# Patient Record
Sex: MALE | Race: WHITE | NOT HISPANIC OR LATINO | Employment: FULL TIME | URBAN - METROPOLITAN AREA
[De-identification: names, ages, dates, MRNs, and addresses within clinical notes are randomized per-mention and may not be internally consistent; named-entity substitution may affect disease eponyms.]

---

## 2019-09-05 ENCOUNTER — OFFICE VISIT (OUTPATIENT)
Dept: FAMILY MEDICINE | Facility: CLINIC | Age: 29
End: 2019-09-05
Payer: COMMERCIAL

## 2019-09-05 VITALS
SYSTOLIC BLOOD PRESSURE: 118 MMHG | WEIGHT: 135.5 LBS | HEIGHT: 65 IN | HEART RATE: 65 BPM | BODY MASS INDEX: 22.57 KG/M2 | DIASTOLIC BLOOD PRESSURE: 75 MMHG | OXYGEN SATURATION: 100 %

## 2019-09-05 DIAGNOSIS — H10.31 ACUTE BACTERIAL CONJUNCTIVITIS OF RIGHT EYE: Primary | ICD-10-CM

## 2019-09-05 RX ORDER — OFLOXACIN 3 MG/ML
1 SOLUTION/ DROPS OPHTHALMIC
Qty: 1 BOTTLE | Refills: 0 | Status: SHIPPED | OUTPATIENT
Start: 2019-09-05 | End: 2021-02-09

## 2019-09-05 RX ORDER — POLYMYXIN B SULFATE AND TRIMETHOPRIM 1; 10000 MG/ML; [USP'U]/ML
1-2 SOLUTION OPHTHALMIC EVERY 4 HOURS
COMMUNITY
End: 2019-09-05 | Stop reason: ALTCHOICE

## 2019-09-05 ASSESSMENT — PAIN SCALES - GENERAL: PAINLEVEL: NO PAIN (0)

## 2019-09-05 ASSESSMENT — MIFFLIN-ST. JEOR: SCORE: 1516.5

## 2019-09-05 NOTE — PATIENT INSTRUCTIONS
First, stop using the trimeth drops. Next, start the Ocuflox. Next, use a warm wash cloth with mild soap as needed. Next, call in an update on Monday the 9th to let me know how things are going. Thank you.   [facility] Medication Refill Request Information:  * Please contact your pharmacy regarding ANY request for medication refills.  ** [facility] Prescription Fax = [phone]  * Please allow 3 business days for routine medication refills.  * Please allow 5 business days for controlled substance medication refills.     [facility] Test Result notification information:  *You will be notified with in 7-10 days of your appointment day regarding the results of your test.  If you are on MyChart you will be notified as soon as the provider has reviewed the results and signed off on them.    [facility]: [phone]

## 2019-09-05 NOTE — PROGRESS NOTES
"       HPI       Edwardo Hope is a 29 year old man who presents today with discomfort of his right eye. He reports that these symptoms have been going on for about one month. He used some Polytrim drops and this helped minimally. His symptoms have not resolved. He is here today to discuss further treatment for this.   Chief Complaint   Patient presents with     Conjunctivitis     Pt is here to discuss itchinness and pink eyes on R eye x1 month        Concern: Right eye discomfort   Description of the problem :here for exam and evaluation of his right eye     Problem, Medication and Allergy Lists were reviewed and updated if needed.    Patient is an established patient of this clinic.         Review of Systems:   Review of Systems     Constitutional:  Negative for fever, chills and fatigue.   Eyes:  Positive for eye irritation.               Physical Exam:     Vitals:    09/05/19 1649   BP: 118/75   Pulse: 65   SpO2: 100%   Weight: 61.5 kg (135 lb 8 oz)   Height: 1.651 m (5' 5\")     Body mass index is 22.55 kg/m .  Vitals were reviewed and were normal     Physical Exam   Constitutional: He is oriented to person, place, and time. He appears well-developed and well-nourished.   HENT:   Head: Normocephalic.   Eyes: Pupils are equal, round, and reactive to light. EOM are normal.   Scleral redness, right eye.   Neck: Normal range of motion. Neck supple.   Musculoskeletal: Normal range of motion.   Neurological: He is alert and oriented to person, place, and time.   Skin: Skin is warm and dry.   Psychiatric: He has a normal mood and affect. His behavior is normal.       Results:     No viridiana\gnostics ordered today.     Assessment and Plan     1. Acute bacterial conjunctivitis of right eye    - ofloxacin (OCUFLOX) 0.3 % ophthalmic solution; Place 1 drop into the right eye every 4 hours (while awake)  Dispense: 1 Bottle; Refill: 0    There are no discontinued medications.  First, stop using the trimethoprim drops. Next, " start the Ocuflox. Next, use a warm wash cloth with mild soap as needed. Next, call in an update on Monday the 9th to let me know how things are going. Thank you. Options for treatment and follow-up care were reviewed with the patient. Edwardo СВЕТЛАНА Hope  engaged in the decision making process and verbalized understanding of the options discussed and agreed with the final plan.  JORDYN Burks, CNP  Addendum 09/09 3823:Edwardo called to say he is somewhat better from last Thursday. He continues to have some film accumulating on his eye, however he says its less red and less itchy. If he does not see more improvement in two days, he will call and I will refer him to opthalmology. He verbalizes understanding of the plan.   JORDYN Burks, CNP

## 2019-09-05 NOTE — NURSING NOTE
"29 year old  Chief Complaint   Patient presents with     Conjunctivitis     Pt is here to discuss itchinness and pink eyes on R eye x1 month       Blood pressure 118/75, pulse 65, height 1.651 m (5' 5\"), weight 61.5 kg (135 lb 8 oz), SpO2 100 %. Body mass index is 22.55 kg/m .  BP completed using cuff size:      Gricel Ramos MA  September 5, 2019 4:53 PM  "

## 2019-09-09 ENCOUNTER — DOCUMENTATION ONLY (OUTPATIENT)
Dept: CARE COORDINATION | Facility: CLINIC | Age: 29
End: 2019-09-09

## 2019-09-09 ASSESSMENT — ENCOUNTER SYMPTOMS
CHILLS: 0
FEVER: 0
EYE IRRITATION: 1
FATIGUE: 0

## 2019-09-12 ENCOUNTER — OFFICE VISIT (OUTPATIENT)
Dept: FAMILY MEDICINE | Facility: CLINIC | Age: 29
End: 2019-09-12
Payer: COMMERCIAL

## 2019-09-12 VITALS
TEMPERATURE: 98.5 F | HEIGHT: 65 IN | SYSTOLIC BLOOD PRESSURE: 117 MMHG | DIASTOLIC BLOOD PRESSURE: 72 MMHG | OXYGEN SATURATION: 100 % | WEIGHT: 133.7 LBS | HEART RATE: 77 BPM | BODY MASS INDEX: 22.28 KG/M2

## 2019-09-12 DIAGNOSIS — Z00.00 HEALTH CARE MAINTENANCE: Primary | ICD-10-CM

## 2019-09-12 ASSESSMENT — ANXIETY QUESTIONNAIRES
GAD7 TOTAL SCORE: 2
5. BEING SO RESTLESS THAT IT IS HARD TO SIT STILL: NOT AT ALL
3. WORRYING TOO MUCH ABOUT DIFFERENT THINGS: NOT AT ALL
7. FEELING AFRAID AS IF SOMETHING AWFUL MIGHT HAPPEN: NOT AT ALL
1. FEELING NERVOUS, ANXIOUS, OR ON EDGE: SEVERAL DAYS
6. BECOMING EASILY ANNOYED OR IRRITABLE: SEVERAL DAYS
2. NOT BEING ABLE TO STOP OR CONTROL WORRYING: NOT AT ALL

## 2019-09-12 ASSESSMENT — ENCOUNTER SYMPTOMS
PANIC: 0
NECK MASS: 0
SORE THROAT: 0
DEPRESSION: 1
EYE WATERING: 1
INSOMNIA: 0
EYE PAIN: 0
SMELL DISTURBANCE: 0
DECREASED CONCENTRATION: 0
EYE IRRITATION: 1
SINUS CONGESTION: 0
TROUBLE SWALLOWING: 0
EYE REDNESS: 1
DOUBLE VISION: 0
TASTE DISTURBANCE: 0
HOARSE VOICE: 0
NERVOUS/ANXIOUS: 0
SINUS PAIN: 0

## 2019-09-12 ASSESSMENT — PATIENT HEALTH QUESTIONNAIRE - PHQ9
5. POOR APPETITE OR OVEREATING: NOT AT ALL
SUM OF ALL RESPONSES TO PHQ QUESTIONS 1-9: 2

## 2019-09-12 ASSESSMENT — MIFFLIN-ST. JEOR: SCORE: 1508.34

## 2019-09-12 ASSESSMENT — PAIN SCALES - GENERAL: PAINLEVEL: NO PAIN (0)

## 2019-09-12 NOTE — PROGRESS NOTES
[facility]  Primary Care Center   Vinayak Whiting MD  09/12/2019      Chief Complaint:   Physical and Establish Care       History of Present Illness:   Edwardo Hope is a 29 year old male with a history of who presents to establish care and have physical.     He moved here from [place] a few weeks ago. 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     Conjunctivitis of right eye: One month ago, he was seen for symptoms consistent with right sided pink eye including, itchiness, redness, swelling and pain and was given Trimethoprim drops. Last Thursday, he was seen with Christie Banks and was told to discontinue use of the drops and start Ocuflox.   Today, he feels like his eye is improving but still has slight residue which he has to clean every 4 hours. Denies that it effects his vision.     Dry nose: One month ago he was traveling throughout [place]. Reports that while he was traveling, he noticed sensitive dry nose and bleeding which was see on a q-tip every morning. He started applying Aquaphor to his nostrils daily. This has improved since and is almost 95% back to normal.     Chest tightness: For the past 5 years he has noticed some chest tightness (about once per month) which he associated with stress. Last week, he had an episode while he was sitting and reading on his couch. The episode lasted about 20 seconds and subsided with breathing exercises. He exercises about 4 times per week and denies that the tightness occurs with exertion. He does have a family history of hyperlipidemia and reports that in college his cholesterol was measured at 240. Last November (10 months ago) his cholesterol was 193. Denies a history of smoking, asthma or pneumonia.     Back cyst: Reports a cyst on his back that has been present for at least a year (was seen with Dr. Ann 11/18). Denies that it itches, drains or is painful. He has had multiple cysts throughout his body over the years and had a cyst  removed from his head years ago.     Urine frequency: Reports that prior to our visit he had some frequent urination with slight odor. He has not had any issues since and thinks it may have been associated with an increase in fluid intake (8 16 oz de dios daily).     Left testicle lump: He has noticed a lump on his left testicle that he would like evaluated. Denies that it is painful.     Depression: He is pretty sure that he has a history of depression. He has never visited a therapist or been treated with antidepressants. He is interested in visiting with a therapist and welcomes recommendations but would like to research them before visiting. He smokes weed once every 2 weeks both recreationally and to treat his depression. He only drinks alcohol occasionally. His PHQ-9 and VALERIE-7 was 2 today (reports this is situational).     Review of Systems:   Pertinent items are noted in HPI or as in patient entered ROS below, remainder of complete ROS is negative.     Active Medications:      ofloxacin (OCUFLOX) 0.3 % ophthalmic solution, Place 1 drop into the right eye every 4 hours (while awake), Disp: 1 Bottle, Rfl: 0      Allergies:   Patient has no known allergies.      Past Medical History:  Elevated cholesterol   Depression      Past Surgical History:  Cyst on head removed   Hernia repair-2010  Right knee surgery- 10 years ago      Family History:   Mother: rheumatoid arthritis   Father: depression, hyperlipidemia  Maternal grandmother: depression, substance abuse   Paternal grandmother: breast cancer ( @90s), depression  Paternal grandfather: heart disease, hyperlipidemia, cerebrovascular disease   Aunt: breast cancer ( @30s)       Social History:   The patient was alone   Smoking Status: never   Smokeless Tobacco: never   Alcohol Use: yes; few drinks per week   Drug use: yes; marijuana (not frequent) once every 2 weeks   Marital Status:       Physical Exam:   /72 (BP Location: Right arm, Patient  "Position: Sitting, Cuff Size: Adult Regular)   Pulse 77   Temp 98.5  F (36.9  C) (Oral)   Ht 1.651 m (5' 5\")   Wt 60.6 kg (133 lb 11.2 oz)   SpO2 100%   BMI 22.25 kg/m     Constitutional: Alert. In no distress.  Head: Normocephalic. No masses, lesions, tenderness or abnormalities.  ENT: Bilateral eyes are free with no drainage. No neck nodes or sinus tenderness.  Cardiovascular: RRR. No murmurs, clicks, gallops, or rub.  Respiratory: Clear to auscultation bilaterally, no wheezes or crackles.  Gastrointestinal: Abdomen soft. Non-tender. BS normal. No masses or organomegaly.  : no inguinal hernias, normal scrotum, penis, testes normal  Musculoskeletal: Extremities normal. No gross deformities noted. Normal muscle tone.  Skin: No suspicious lesions. No rashes. Left upper back with 1 inch subdermal nodule that is soft and non tender, underlining skin is normal.    Neurologic: Gait normal. Reflexes normal and symmetric. Sensation grossly WNL.  Psychiatric: Mentation appears normal. Normal affect.   Hematologic/Lymphatic/Immunologic: Normal cervical lymph nodes.      Assessment and Plan:  Health care maintenance  He will call hometown doctor to see if he has had a tetanus booster in the past 10 years. If not then he will get one at local pharmacy. Last fall (10 months ago), his lipids were normal. We will check again in 1-2 years. Encouraged him to continue with current diet and exercise patterns.   - FLU VAC PRESRV FREE QUAD SPLIT VIR 3+YRS IM    Recent eye infection  Normal exam. Still notes some drainage. Will alert us if worse.    Dry nose  He has some irritation in nostrils which is improving. He will notify if gets worse.      Chest tightness   Non exertional and lasts less the one minute. He will follow up PRN. Given that his grandfather had heart trouble he is concerned.     Skin lesion   This is consistent with lymphoma or cyst. Will contact if larger or painful.      Depression   PHQ-9 and VALERIE-7 were " both 2. He would like to visit a therapist but will do research on his own. May have adjustment disorder with [facility] and [place] to live in. If does not find therapist on his own, he will contact us.     Follow-up: Follow up in one year with physical or sooner if needed      Scribe Disclosure:  I, Shelley Bragg, am serving as a scribe to document services personally performed by Vinayak Whiting MD at this visit, based upon the provider's statements to me. All documentation has been reviewed by the aforementioned provider prior to being entered into the official medical record.     Portions of this medical record were completed by a scribe. UPON MY REVIEW AND AUTHENTICATION BY ELECTRONIC SIGNATURE, this confirms (a) I performed the applicable clinical services, and (b) the record is accurate.     Answers for HPI/ROS submitted by the patient on 9/12/2019   General Symptoms: No  Skin Symptoms: No  HENT Symptoms: Yes  EYE SYMPTOMS: Yes  HEART SYMPTOMS: No  LUNG SYMPTOMS: No  INTESTINAL SYMPTOMS: No  URINARY SYMPTOMS: No  REPRODUCTIVE SYMPTOMS: No  SKELETAL SYMPTOMS: No  BLOOD SYMPTOMS: No  NERVOUS SYSTEM SYMPTOMS: No  MENTAL HEALTH SYMPTOMS: Yes  Ear pain: No  Ear discharge: No  Hearing loss: No  Tinnitus: No  Nosebleeds: Yes  Congestion: No  Sinus pain: No  Trouble swallowing: No   Voice hoarseness: No  Mouth sores: No  Sore throat: No  Tooth pain: No  Gum tenderness: No  Bleeding gums: No  Change in taste: No  Change in sense of smell: No  Dry mouth: No  Hearing aid used: No  Neck lump: No  Eye pain: No  Vision loss: No  Dry eyes: No  Watery eyes: Yes  Eye bulging: No  Double vision: No  Flashing of lights: No  Spots: No  Floaters: No  Redness: Yes  Crossed eyes: No  Tunnel Vision: No  Yellowing of eyes: No  Eye irritation: Yes  Nervous or Anxious: No  Depression: Yes  Trouble sleeping: No  Trouble thinking or concentrating: No  Mood changes: No  Panic attacks: No  Vinayak Whiting MD MD

## 2019-09-12 NOTE — NURSING NOTE
Chief Complaint   Patient presents with     Physical     pt here for a physical     Establish Care     pt here to establish care       VICTORINA Phillips at 1:34 PM on 9/12/2019.

## 2019-09-12 NOTE — PATIENT INSTRUCTIONS
[facility] Medication Refill Request Information:  * Please contact your pharmacy regarding ANY request for medication refills.  ** [facility] Prescription Fax = [phone]  * Please allow 3 business days for routine medication refills.  * Please allow 5 business days for controlled substance medication refills.     [facility] Test Result notification information:  *You will be notified with in 7-10 days of your appointment day regarding the results of your test.  If you are on MyChart you will be notified as soon as the provider has reviewed the results and signed off on them.    [facility] [phone]

## 2019-09-13 ASSESSMENT — ANXIETY QUESTIONNAIRES: GAD7 TOTAL SCORE: 2

## 2020-04-08 ENCOUNTER — VIRTUAL VISIT (OUTPATIENT)
Dept: DERMATOLOGY | Facility: CLINIC | Age: 30
End: 2020-04-08
Payer: COMMERCIAL

## 2020-04-08 DIAGNOSIS — L21.9 SEBORRHEIC DERMATITIS: Primary | ICD-10-CM

## 2020-04-08 RX ORDER — KETOCONAZOLE 20 MG/ML
SHAMPOO TOPICAL
Qty: 120 ML | Refills: 11 | Status: SHIPPED | OUTPATIENT
Start: 2020-04-08 | End: 2021-09-16

## 2020-04-08 ASSESSMENT — PAIN SCALES - GENERAL: PAINLEVEL: NO PAIN (0)

## 2020-04-08 NOTE — PATIENT INSTRUCTIONS
[facility] Teledermatology Visit    Thank you for allowing us to participate in your care. Your findings, instructions and follow-up plan are as follows:    Hi Mr. Hope, it was very nice to talk with you today. Thank you for working with us on the video visit :) please see your information below. Let us know if any issues.     - diagnosis: seborrheic dermatitis (dandruff)  - use ketoconazole 2% shampoo 3x a week, alternate with other over the counter anti-dandruff shampoos such as head & Shoulders which has Pyrithione Zinc as the active ingredient (see below for complete list). Be sure to massage ketoconazole shampoo into scalp and let sit 3-5 min.  - use ketoconazole 2% shampoo as a face wash in the shower for flaking on the face   - should notice improvement in about 1 month, if still flaky or itchy, can add on a topical steroid (send me a [employer] message)  - if still having facial flaking or itching in 1 month, can add on cream version of ketoconazole       When should I call my doctor?    If you are worsening or not improving, please, contact us or seek urgent care as noted below.     Who should I call with questions?    [facility]: [phone]     [facility]: [phone]    If this is a medical emergency and you are unable to reach an ER, Call 911    Dandruff Shampoos    What do I use dandruff shampoos for?    Flaking    Redness    Itching    How and when do I use these shampoos?    Rub gently into scalp.    Leave on for at least five minutes before rinsing.    You will feel better with daily use.    As redness, flaking, and itching get better you can use the shampoo less    Using two shampoos with different active elements may work best. Switch shampoos each week.    What shampoos can I buy?  Below is a list of active elements that help with itching, redness, and flaking. Name brand shampoos having these elements are  included.  Non-brand shampoos that have these active elements can also be used.     Selenium Sulfide  o Blue Head and Shoulders (1% selenium sulfide)  o Selsun Blue (1% selenium sulfide)  o Selsun Gold (2.5% selenium sulfide, prescription needed)    Salicylic Acid  o Neutrogena T/Sal  (3% salicylic acid)  o Sebulex  (2% sulfur, 2% salicylic acid)  o Ionil  (2% salicylic acid)    Pyrithione Zinc  o White Head and Shoulders (1% pyrithione zinc)  o DHS Zinc Shampoo (1% pyrithione zinc)    Ketoconazole  o Nizoral  (1% ketoconazole)    Tar (Note: tar may turn white/gray hair yellow if used often)  o Neutrogena T/Gel (0.5% coal tar)  o Neutrogena T/Gel extra strength (1% coal tar)  o DHS Tar Shampoo (0.5% coal tar)

## 2020-04-08 NOTE — PROGRESS NOTES
Dermatology Rooming Note    Edwardo Hope's goals for this visit include:   Chief Complaint   Patient presents with     Derm Problem     Shelli is concerned about an increase in shedding of hair and a lot dandruff.     Lisbet Manzanares, [facility]

## 2020-04-08 NOTE — PROGRESS NOTES
[facility]    Impression and Recommendations (Patient Counseled on the Following):  1.  Seborrheic dermatitis  Etiology and need for lifelong treatment reviewed with patient.  Discussed that seborrheic dermatitis often responds best to treatment when multiple treatment modalities/active ingredients are used.  Therefore will prescribe ketoconazole shampoo which should be used along with over-the-counter and anti-dandruff shampoos, preferably one containing zinc pyrithione.  Given facial involvement, also advised that ketoconazole shampoo can be used as a face wash and that should this not be sufficient, and ketoconazole cream can be added on.  We will hold off on topical steroids for now, but if patient continues to have persistent flaking and itching on this regiment will consider adding this on.  Patient endorsed understanding and was amenable with this plan.  -Start ketoconazole 2% shampoo, use at least 3 times weekly, alternating with over-the-counter antidandruff shampoo (preferably one with zinc pyrithione)  -Consider addition of ketoconazole 2% cream as needed for facial involvement if no improvement with ketoconazole shampoo as a face wash  -Consider addition of topical steroids as needed  -Okay to continue using coconut oil if desired    Follow-up:   PRN - he will send mychart with issues or if needs topical steroid     Staff and resident:    Dr. East was present during the entirety of this encounter.     Cierra Bryson MD (PGY-2)  Dermatology Resident     Staff Physician Comments:   I saw and evaluated the patient with the resident and I agree with the assessment and plan.  I was present for the entire phone call and examination.    Loni East MD    Department of Dermatology  [facility]: Phone: [phone], Fax: [phone]  4/8/2020    ____________________________________________________________________    Dermatology Problem List:  1. Seborrheic dermatitis  - ketoconazole 2% shampoo alternating with OTC anti-dandruff shampoos     Encounter Date: Apr 8, 2020    CC:   Chief Complaint   Patient presents with     Derm Problem     Shelli is concerned about an increase in shedding of hair and a lot dandruff.       History of Present Illness:  I have reviewed the teledermatology  information and the nursing intake corresponding to this issue. Edwardo Hope is a 29 year old male who presents via teledermatology for evaluation of dandruff.    Patient states that he has had dandruff and flaking of the scalp since his early 20s.  Over the past couple of years, it has gradually gotten worse.  It is very itchy.  Also affects the sides of the beard.  Has tried numerous over-the-counter treatments including over-the-counter antidandruff shampoos without much improvement.  Currently using Argun oil shampoo along with coconut oil, which has been helping a little.  No discrete patches on the scalp, only diffuse scale. No rash elsewhere on the body.  Otherwise healthy.  No personal or family history of melanoma.    ROS: Patient is generally feeling well today    Physical Examination:  General: Well-appearing male, appropriately-developed individual.  Skin: Focused examination of the scalp within the teledermatology photograph(s)* was performed.   -Diffuse loose and adherent brown like scale distributed on the scalp    Labs: None    Past Medical History:   No significant/pertinent past medical history    Social History:  Patient reports that he has never smoked. He has never used smokeless tobacco.    Family History:  No history of melanoma  History of thyroid disease in many family members    Medications:  Current Outpatient Medications   Medication     ketoconazole (NIZORAL) 2 % external shampoo     ofloxacin (OCUFLOX) 0.3 % ophthalmic solution     No current  facility-administered medications for this visit.       No Known Allergies    ___________________________________________________________________________    Teledermatology information:  - Location of patient: Home  - Patient presented as: self referral  - Location of teledermatologist: [facility] DERMATOLOGY  - Reason teledermatology is appropriate: National Emergency Regarding Coronavirus disease (COVID 19) Outbreak  - Method of transmission:  Store and Forward  - Image quality and interpretability: acceptable  - Physician has received verbal consent for a Video/Photos Visit from the patient? Yes  - In-person dermatology visit recommendation: no  - Date of images: 3/26/20  - Service start time: 10:01 AM  - Service end time: 10:26 AM  - Date of report: 04/08/2020

## 2020-04-08 NOTE — PROGRESS NOTES
"[facility]: Method of transmission:  Kahlil(National Emergency Concerning the CORONAVIRUS (COVID 19))  Invitation sent by: Send to e-mail at: [email or web address]      Impression and Recommendations (Patient Counseled on the Following):  1: ***  -{plan:744364}    2: ***  -{plan:993674}      Follow-up:   {follow-up:163061}     [facility]\"[facility]\"}:    ***    _____________________________________________________________________________    Dermatology Problem List:  ***    Encounter Date: Apr 8, 2020    CC:   Chief Complaint   Patient presents with     Derm Problem     Shelli is concerned about an increase in shedding of hair and a lot dandruff.       History of Present Illness:  I have reviewed the teledermatology information and the nursing intake corresponding to this issue. Edwardo Hope is a 29 year old male who presents via teledermatology for ***.      ROS: Patient is generally feeling well today ***    Physical Examination:  General: Well-appearing***, appropriately-developed individual.  Skin: Focused examination of the *** within the teledermatology photograph(s)* was performed.   -***    Labs:***    Past Medical History:   There is no problem list on file for this patient.    No past medical history on file.  No past surgical history on file.    Social History:  ***    Family History:  No family history on file.    Medications:  Current Outpatient Medications   Medication     ofloxacin (OCUFLOX) 0.3 % ophthalmic solution     No current facility-administered medications for this visit.           No Known Allergies      _____________________________________________________________________________    Teledermatology information:  - Location of patient: {video visit patient location:898763::\"Home\"}  - Patient presented as: {self referral other:107243::\"return\"}  - Location of teledermatologist:  ([facility] DERMATOLOGY )  - Reason teledermatology is appropriate:  " "{[facility]:246473}  - Image quality and interpretability: {imagequality:317036}  - Physician has received verbal consent for a Video/Photos Visit from the patient? {YES-NO  Default Yes:4444::\"Yes\"}  - In-person dermatology visit recommendation: {visit needed:788675}  - Date of images: ***  - Service start time:***  - Service end time:***  - Date of report: 04/08/20  "

## 2020-04-22 ENCOUNTER — VIRTUAL VISIT (OUTPATIENT)
Dept: FAMILY MEDICINE | Facility: CLINIC | Age: 30
End: 2020-04-22
Payer: COMMERCIAL

## 2020-04-22 ENCOUNTER — MYC MEDICAL ADVICE (OUTPATIENT)
Dept: FAMILY MEDICINE | Facility: CLINIC | Age: 30
End: 2020-04-22

## 2020-04-22 DIAGNOSIS — R21 RASH: ICD-10-CM

## 2020-04-22 DIAGNOSIS — L65.9 HAIR LOSS: Primary | ICD-10-CM

## 2020-04-22 NOTE — NURSING NOTE
Chief Complaint   Patient presents with     Swelling     strong family HX of thyroid issues. Concerned that toes swelling is due to thyroid problems     Kimberly Nissen, EMT at 9:51 AM on 4/22/2020

## 2020-04-22 NOTE — PROGRESS NOTES
"Edwardo Hope is a 29 year old male who is being evaluated via a billable video visit.      The patient has been notified of following:     \"This video visit will be conducted via a call between you and your physician/provider. We have found that certain health care needs can be provided without the need for an in-person physical exam.  This service lets us provide the care you need with a video conversation.  If a prescription is necessary we can send it directly to your pharmacy.  If lab work is needed we can place an order for that and you can then stop by our lab to have the test done at a later time.    Video visits are billed at different rates depending on your insurance coverage.  Please reach out to your insurance provider with any questions.    If during the course of the call the physician/provider feels a video visit is not appropriate, you will not be charged for this service.\"    Patient has given verbal consent for Video visit? Yes    How would you like to obtain your AVS? Jasen    Patient would like the video invitation sent by: Send to e-mail at: [email or web address]    Will anyone else be joining your video visit? No  Video start 11:31, stop 11:57  Pt at home, MD at home    S-  Chronic issues w/ feet, sometimes hand, getting unusually cold/white, usually if exposed to cold, sometimes randomly inside. Strong FH autoimmune-mgm hashimotos, mat unclue graves, mom graves, RA, Sjogrens. Last 2 weeks or so, toes (various toes) puffy, red, tender, some flaking after other symptoms appear. Is self isolating, no special covid risks.    WIth FH thyroid, and he thinks mild scalp hair loss (just saw derm for dandruff, they did not note it), and some fatigue (sees therapist for depression), wonders if thyroid disease. Some wt gain but correalates w/ covid time/winter.    Current Outpatient Medications   Medication     ketoconazole (NIZORAL) 2 % external shampoo     ofloxacin (OCUFLOX) 0.3 % ophthalmic solution "     No current facility-administered medications for this visit.      No Known Allergies    Exam: awake, alert, NAD. Well groomed, nourished, hydrated appearing. Feet no edema, normal skin color except rash areas.   Toes: red, puffy, flaky, in areas, seen on video and separate photo uploads. No open areas or drg noted. Hard to tell if toe rash is from bleeding or rash on video.    1--rash: possible covid toes. I've messaged his derm MD in case they can look at pics. Monitor, per pt better last few days. I cannot offer test I told him.  If covid he's isolating self.  2-possible raynauds, strong FH autoimmune. Discussed. Review next time seen in person.  3-will check tsh for above reasons    Vinayak Whiting MD

## 2020-04-23 ENCOUNTER — TELEPHONE (OUTPATIENT)
Dept: INTERNAL MEDICINE | Facility: CLINIC | Age: 30
End: 2020-04-23

## 2020-04-23 ENCOUNTER — VIRTUAL VISIT (OUTPATIENT)
Dept: FAMILY MEDICINE | Facility: OTHER | Age: 30
End: 2020-04-23

## 2020-04-23 NOTE — PROGRESS NOTES
"Date: 2020 16:48:12  Clinician: Reyna Whitehead  Clinician NPI: [id number]  Patient: Edwardo Hope  Patient : 1990  Patient Address: [address], [place] [address]  Patient Phone: [phone]  Visit Protocol: URI  Patient Summary:  Edwardo is a 29 year old ( : 1990 ) male who initiated a Visit for COVID-19 (Coronavirus) evaluation and screening. When asked the question \"Please sign me up to receive news, health information and promotions from [employer].\", Edwardo responded \"No\".    Edwardo states his symptoms started suddenly 10-13 days ago. After his symptoms started, they improved and then got worse again.   Edwardo denies having rhinitis, chills, diarrhea, facial pain or pressure, sore throat, cough, nasal congestion, malaise, ear pain, fever, myalgias, vomiting, nausea, teeth pain, headache, wheezing, anosmia, and ageusia. He also denies taking antibiotic medication for the symptoms and having recent facial or sinus surgery in the past 60 days. He is not experiencing dyspnea.    Pertinent COVID-19 (Coronavirus) information  Edwardo has not traveled internationally or to the areas where COVID-19 (Coronavirus) is widespread, including cruise ship travel in the last 14 days before the start of his symptoms.   Edwardo does not work or volunteer as healthcare worker or a  and does not work or volunteer in a healthcare facility.   He lives with a healthcare worker.   Edwardo has not had a close contact with a laboratory-confirmed COVID-19 patient within 14 days of symptom onset. He also has not had a close contact with a suspected COVID-19 patient within 14 days of symptom onset.   Pertinent medical history  Edwardo does not need a return to work/school note.   Weight: 140 lbs   Edwardo does not smoke or use smokeless tobacco.   Additional information as reported by the patient (free text): I have what appear to be \"COVID toes\". My toes are randomly swollen, very cold, red, lightly bruised, " have dry patches, and somewhat painful to the touch. Some toes get better, then others get worse, etc. I have no other symptoms of COVID and otherwise feel healthy.   Weight: 140 lbs    MEDICATIONS: ketoconazole topical, ALLERGIES: NKDA  Clinician Response:  Dear Edwardo,  Based on the information provided, you have acute bacterial sinusitis, also known as a sinus infection. Sinus infections are caused by bacteria or a virus and symptoms are almost always identical. The difference between the 2 types of infections is timing.  Sinus infections start as viral infections and symptoms improve on their own in about 7 days. If symptoms have not improved after 7 days or have even worsened, a bacterial infection may have developed.  Medication information  I am prescribing:     Amoxicillin-pot clavulanate 875-125 mg oral tablet. Take 1 tablet by mouth every 12 hours for 7 days. There are no refills with this prescription.   Unless you are allergic to the over-the-counter medication(s) below, I recommend using:       Acetaminophen (Tylenol or store brand) oral tablet. Take 1-2 tablets by mouth every 4-6 hours to help with the discomfort.      Ibuprofen (Advil or store brand) 200 mg oral tablet. Take 1-3 tablets (200-600 mg) by mouth every 8 hours to help with the discomfort. Make sure to take the ibuprofen with food. Do not exceed 2400 mg in 24 hours.     Over-the-counter medications do not require a prescription. Ask the pharmacist if you have any questions.  Self care  Steps you can take to be as comfortable as possible:     Rest.    Drink plenty of fluids.     When to seek care  Please be seen in a clinic or urgent care if any of the following occur:     New symptoms develop, or symptoms become worse    Symptoms do not start to improve after 3 days of treatment     Call ahead before going to the clinic or urgent care.  It is possible to have an allergic reaction to an antibiotic even if you have not had one in the past. If  you notice a new rash, significant swelling, or difficulty breathing, stop taking this medication immediately and go to a clinic or urgent care.  COVID-19 (Coronavirus) General Information  Because there is currently no vaccine to prevent infection, the best way to protect yourself is to avoid being exposed to this virus. Common symptoms of COVID-19 include but are not limited to fever, cough, and shortness of breath. These symptoms appear 2-14 days after you are exposed to the virus that causes COVID-19. Click here for more information from the CDC on how to protect yourself.  If you are sick with COVID-19 or suspect you are infected with the virus that causes COVID-19, follow the steps here from the CDC to help prevent the disease from spreading to people in your home and community.  Click here for general information from the CDC on testing.  If you develop any of these emergency warning signs for COVID-19, get medical attention immediately:     Trouble breathing    Persistent pain or pressure in the chest    New confusion or inability to arouse    Bluish lips or face      Call your doctor or clinic before going in. Call 911 if you have a medical emergency and notify the  you have or think you may have COVID-19.  For more detailed and up to date information on COVID-19 (Coronavirus), please visit the CDC website.   Diagnosis: Acute bacterial sinusitis  Diagnosis ICD: J01.90  Prescription: amoxicillin-pot clavulanate 875-125 mg oral tablet 14 tablet, 7 days supply. Take 1 tablet by mouth every 12 hours for 7 days. Refills: 0, Refill as needed: no, Allow substitutions: yes

## 2020-04-23 NOTE — TELEPHONE ENCOUNTER
I sent [place] message to the pt regarding this matter. Our clinic do not offer covid-19 testing yet and he needs to go through [email or web address] to follow the guideline and he needs to stay home isolated.    Soon-Mi  ------------------------------------------------------------        ----- Message from Vinayak Whiting MD sent at 4/23/2020 12:00 PM CDT -----  Let him know derm MD agrees, could be covid toes.     Do you know if we can test people yet outside of ER? Eda has it we can soon. If so, he should do a test.    Tell him that unless we can do a test to help us know what to do a good rule of thumb is stay home isolated for a week after symptoms clear up.  ----- Message -----  From: Loni East MD  Sent: 4/23/2020  10:47 AM CDT  To: Vinayak Whiting MD    Hard to say for sure.  Sounds like he does have some prior issues but if the redness/soreness is new, definitely could be COVID toes and photos could fit with that.  Since testing so hard to get, would presume COVID toes and continue self-quarantine. He should get testing when available.  Hope that helps.  ----- Message -----  From: Vinayak Whiting MD  Sent: 4/22/2020   6:51 PM CDT  To: Loni East MD    Hi,  I'm his primary.  Might have some underlying Raynauds, mild if so. Chronic.    Just last ten days or so, odd toe issues. Zero other symptoms. Young healthy male.    He just uploaded pics via [employer] today.     Sore, red blotches, puffy, flaky after other sx appear.    Covid toes?    He just saw you for other reasons so I'm picking on you.    He's self isolating anyway, if got covid would be random.    Thanks  Bill

## 2020-06-22 DIAGNOSIS — Z00.00 ROUTINE HISTORY AND PHYSICAL EXAMINATION OF ADULT: ICD-10-CM

## 2020-06-22 DIAGNOSIS — L65.9 HAIR LOSS: ICD-10-CM

## 2020-06-22 LAB
CHOLEST SERPL-MCNC: 186 MG/DL
DEPRECATED CALCIDIOL+CALCIFEROL SERPL-MC: 50 UG/L (ref 20–75)
HDLC SERPL-MCNC: 52 MG/DL
LDLC SERPL CALC-MCNC: 120 MG/DL
NONHDLC SERPL-MCNC: 133 MG/DL
TRIGL SERPL-MCNC: 65 MG/DL
TSH SERPL DL<=0.005 MIU/L-ACNC: 1.15 MU/L (ref 0.4–4)

## 2021-01-03 ENCOUNTER — HEALTH MAINTENANCE LETTER (OUTPATIENT)
Age: 31
End: 2021-01-03

## 2021-02-05 NOTE — TELEPHONE ENCOUNTER
DIAGNOSIS: (L) Ankle sprain, per pt. ref by Vinayak Whiting, no images or records    APPOINTMENT DATE: 2/9   NOTES STATUS DETAILS   OFFICE NOTE from referring provider Internal Vinayak Whiting   OFFICE NOTE from other specialist N/A    DISCHARGE SUMMARY from hospital N/A    DISCHARGE REPORT from the ER N/A    OPERATIVE REPORT N/A    EMG report N/A    MEDICATION LIST Internal    MRI N/A    DEXA (osteoporosis/bone health) N/A    CT SCAN N/A    XRAYS (IMAGES & REPORTS) N/A

## 2021-02-08 ENCOUNTER — PRE VISIT (OUTPATIENT)
Dept: ORTHOPEDICS | Facility: CLINIC | Age: 31
End: 2021-02-08

## 2021-02-09 ENCOUNTER — ANCILLARY PROCEDURE (OUTPATIENT)
Dept: GENERAL RADIOLOGY | Facility: CLINIC | Age: 31
End: 2021-02-09
Attending: FAMILY MEDICINE
Payer: COMMERCIAL

## 2021-02-09 ENCOUNTER — OFFICE VISIT (OUTPATIENT)
Dept: ORTHOPEDICS | Facility: CLINIC | Age: 31
End: 2021-02-09
Payer: COMMERCIAL

## 2021-02-09 VITALS
WEIGHT: 143.6 LBS | HEIGHT: 65 IN | HEART RATE: 91 BPM | DIASTOLIC BLOOD PRESSURE: 87 MMHG | SYSTOLIC BLOOD PRESSURE: 144 MMHG | BODY MASS INDEX: 23.93 KG/M2

## 2021-02-09 DIAGNOSIS — S93.402S MODERATE ANKLE SPRAIN, LEFT, SEQUELA: ICD-10-CM

## 2021-02-09 DIAGNOSIS — S93.402S MODERATE ANKLE SPRAIN, LEFT, SEQUELA: Primary | ICD-10-CM

## 2021-02-09 PROCEDURE — 99203 OFFICE O/P NEW LOW 30 MIN: CPT | Performed by: FAMILY MEDICINE

## 2021-02-09 PROCEDURE — 73610 X-RAY EXAM OF ANKLE: CPT | Mod: LT | Performed by: RADIOLOGY

## 2021-02-09 ASSESSMENT — MIFFLIN-ST. JEOR: SCORE: 1548.25

## 2021-02-09 NOTE — PROGRESS NOTES
"Assessment/Plan:    (J58.628H) Moderate ankle sprain, left, sequela  (primary encounter diagnosis)  Comment: will start PT for ankle proprioception; we discussed ankle bracing prn; will f/u prn   Plan: X-ray lt ankle G/E 3 views*, PHYSICAL THERAPY REFERRAL (Internal)          Eliud Cohn MD  February 9, 2021  11:55 AM      Pt is a 30 year old male referred by Dr Whiting here today for:     L Ankle pain:   Location:  Diffuse left ankle   Duration: 12/21/20   Trauma/ Fall? Yes - playing basketball when he landed on uneven concrete and had inversion mechanism and felt \"pop\". Previous hx of multiple sprains to the same ankle.   Able to walk? Yes currently, but not initially   Swelling? Yes  Bruising? Initially  Numbness/ Tingling? No  Weakness? Yes  Instability? No  Snapping/Clicking? Yes with inversion   Imaging? No recent imaging.   Treatment? RICE 1 week. WBAT with crutches. HEP.     Per [email or web address] message on 1/29/21:  Dear Dr. Whiting,     I sprained my left ankle about a month ago -- Dec 21st (I've sprained this ankle many times prior; 5+). My guess was a grade 2: significant bruising, swelling, painful to touch, walkable with pain. I can now walk with no pain, but it is still slightly swollen, very stiff, low level pain at times, and mobility is down. I've been doing exercises at home for a week or so, but think it might be wise at this point to go to a Physical Therapist as it is not improving as quickly as I'd like. Would you be able to write me a note for this? I'm assuming an appt isn't necessary?   Thank you,  yamil will      Past Medical History:   Diagnosis Date     Recurrent L ankle sprains       Past Surgical History:   Procedure Laterality Date     ARTHROSCOPY KNEE Right 2012     HERNIA REPAIR Right 2008    inguinal      Current Outpatient Medications   Medication Sig Dispense Refill     ketoconazole (NIZORAL) 2 % external shampoo Massage into wet scalp, let sit 3-5 min, then rinse. Do this 3x weekly. " "120 mL 11      No Known Allergies   Social History     Tobacco Use     Smoking status: Never Smoker     Smokeless tobacco: Never Used   Substance Use Topics     Alcohol use: None     Drug use: None      No family history on file.   ROS:   Gen- no fevers/chills   Rheum - no morning stiffness   Derm - no rash/ redness   Neuro - no numbness, no tingling   Remainder of ROS negative.     Exam:   BP (!) 144/87 (BP Location: Left arm, Patient Position: Sitting, Cuff Size: Adult Regular)   Pulse 91   Ht 1.651 m (5' 5\")   Wt 65.1 kg (143 lb 9.6 oz)   BMI 23.90 kg/m         L Foot/Ankle:   Inspection: Swelling - mild soft tissue lateral; Bruising - No   Sensation: intact in peroneal, tibial, sural, and saphenous distribution   ROM: Dorsiflexion - full; Plantarflexion - mildly limited; Inversion -full; Eversion -  limited;    Strength: 5/5 in all motions; No Pain w/ resisted motions  Bony tenderness: Medial malleolus? - NO; Lateral malleolus? - YES; Navicular? - NO; 5th Metatarsal? - NO; Other - NO  Ligaments Tenderness: ATFL/CFL -No; Deltoid - NO; Syndesmosis - NO; LisFranc - NO  Tendons: Posterior Tibialis - No; Peroneals - NO; Achilles - NO   Maneuvers: Anterior Drawer - 2+ laxity ; Talar Tilt - 1+; Squeeze - Neg; Hop - deferred; Weiner - intact      [place] [facility] [place]             "

## 2021-02-09 NOTE — LETTER
"  2/9/2021      RE: Edwardo Will  [address]  [place] [address]       Assessment/Plan:    (B94.620W) Moderate ankle sprain, left, sequela  (primary encounter diagnosis)  Comment: will start PT for ankle proprioception; we discussed ankle bracing prn; will f/u prn   Plan: X-ray lt ankle G/E 3 views*, PHYSICAL THERAPY REFERRAL (Internal)          Eliud Cohn MD  February 9, 2021  11:55 AM      Pt is a 30 year old male referred by Dr Whiting here today for:     L Ankle pain:   Location:  Diffuse left ankle   Duration: 12/21/20   Trauma/ Fall? Yes - playing basketball when he landed on uneven concrete and had inversion mechanism and felt \"pop\". Previous hx of multiple sprains to the same ankle.   Able to walk? Yes currently, but not initially   Swelling? Yes  Bruising? Initially  Numbness/ Tingling? No  Weakness? Yes  Instability? No  Snapping/Clicking? Yes with inversion   Imaging? No recent imaging.   Treatment? RICE 1 week. WBAT with crutches. HEP.     Per [employer] message on 1/29/21:  Dear Dr. Whiting,     I sprained my left ankle about a month ago -- Dec 21st (I've sprained this ankle many times prior; 5+). My guess was a grade 2: significant bruising, swelling, painful to touch, walkable with pain. I can now walk with no pain, but it is still slightly swollen, very stiff, low level pain at times, and mobility is down. I've been doing exercises at home for a week or so, but think it might be wise at this point to go to a Physical Therapist as it is not improving as quickly as I'd like. Would you be able to write me a note for this? I'm assuming an appt isn't necessary?   Thank you,  yamil will      Past Medical History:   Diagnosis Date     Recurrent L ankle sprains       Past Surgical History:   Procedure Laterality Date     ARTHROSCOPY KNEE Right 2012     HERNIA REPAIR Right 2008    inguinal      Current Outpatient Medications   Medication Sig Dispense Refill     ketoconazole (NIZORAL) 2 % external " "shampoo Massage into wet scalp, let sit 3-5 min, then rinse. Do this 3x weekly. 120 mL 11      No Known Allergies   Social History     Tobacco Use     Smoking status: Never Smoker     Smokeless tobacco: Never Used   Substance Use Topics     Alcohol use: None     Drug use: None      No family history on file.   ROS:   Gen- no fevers/chills   Rheum - no morning stiffness   Derm - no rash/ redness   Neuro - no numbness, no tingling   Remainder of ROS negative.     Exam:   BP (!) 144/87 (BP Location: Left arm, Patient Position: Sitting, Cuff Size: Adult Regular)   Pulse 91   Ht 1.651 m (5' 5\")   Wt 65.1 kg (143 lb 9.6 oz)   BMI 23.90 kg/m         L Foot/Ankle:   Inspection: Swelling - mild soft tissue lateral; Bruising - No   Sensation: intact in peroneal, tibial, sural, and saphenous distribution   ROM: Dorsiflexion - full; Plantarflexion - mildly limited; Inversion -full; Eversion -  limited;    Strength: 5/5 in all motions; No Pain w/ resisted motions  Bony tenderness: Medial malleolus? - NO; Lateral malleolus? - YES; Navicular? - NO; 5th Metatarsal? - NO; Other - NO  Ligaments Tenderness: ATFL/CFL -No; Deltoid - NO; Syndesmosis - NO; LisFranc - NO  Tendons: Posterior Tibialis - No; Peroneals - NO; Achilles - NO   Maneuvers: Anterior Drawer - 2+ laxity ; Talar Tilt - 1+; Squeeze - Neg; Hop - deferred; Weiner - intact      [place] - 2[place]9/2021 [place] [facility] [place]    Negative         Eliud Cohn MD    "

## 2021-04-08 ENCOUNTER — TRANSFERRED RECORDS (OUTPATIENT)
Dept: HEALTH INFORMATION MANAGEMENT | Facility: CLINIC | Age: 31
End: 2021-04-08

## 2021-05-11 ENCOUNTER — MYC MEDICAL ADVICE (OUTPATIENT)
Dept: DERMATOLOGY | Facility: CLINIC | Age: 31
End: 2021-05-11

## 2021-05-11 ENCOUNTER — OFFICE VISIT (OUTPATIENT)
Dept: DERMATOLOGY | Facility: CLINIC | Age: 31
End: 2021-05-11
Payer: COMMERCIAL

## 2021-05-11 DIAGNOSIS — L65.9 NON-SCARRING ALOPECIA: ICD-10-CM

## 2021-05-11 DIAGNOSIS — L21.9 SEBORRHEIC DERMATITIS: Primary | ICD-10-CM

## 2021-05-11 PROCEDURE — 99213 OFFICE O/P EST LOW 20 MIN: CPT | Mod: GC | Performed by: DERMATOLOGY

## 2021-05-11 RX ORDER — FLUOCINOLONE ACETONIDE 0.11 MG/ML
OIL TOPICAL 2 TIMES DAILY
Qty: 118.25 ML | Refills: 3 | Status: SHIPPED | OUTPATIENT
Start: 2021-05-11

## 2021-05-11 ASSESSMENT — PAIN SCALES - GENERAL: PAINLEVEL: NO PAIN (0)

## 2021-05-11 NOTE — PATIENT INSTRUCTIONS
1. Continue using the ketoconazole shampoo  2. Start using the Fluocinolone oil (steroid). It is ok to use this everyday at the start. You can start to back off to every other day or every few days. This is best applied at night.  3. Follow up in 4-6 weeks if this is not getting better.    4. For the hair loss we recommend starting over the counter Rogaine    Topical Rogaine (Minoxidil) for Pattern Hair Loss      Minoxidil is an FDA approved over the counter topical for the treatment of hair loss and thinning hair in men and women.     Initially a 2% solution was available however this required application twice daily. A 5% solution is also now approved, only requiring application once per day.     Available Products:     Rogaine 5% solution: Packaged for men however can be used by men or women. Use dropper and apply directly to scalp at bedtime. This product can cause an allergy because of presence of propylene glycol. Stop this product if you develop a rash or itching and contact your physician.     Rogaine 5% foam: Packaged for men and women: Apply foam directly to the scalp once daily. This is less greasy compared to the solution. This formula is preferred for those who had a reaction to the solution product. If you develop rash or itching, stop the product and contact your physician.     What if I stop minoxidil topical?     After stopping minoxidil the hair will return to the usual pattern of thinning. Using the product 3-4 times per week is better than not using product at all.       Can I use generic minoxidil?     Yes, look for 5% minoxidil.     What are the side effects?    The most common side effect is rash or itching of the scalp. This can occur if a contact allergy develops with propylene glycol. A small group of patients noticed the appearance of facial hair if the product runs onto the face or with prolonged use.       Last updated: 6/26/2016

## 2021-05-11 NOTE — PROGRESS NOTES
"[facility] Dermatology Note  Encounter Date: May 11, 2021  Office Visit     Dermatology Problem List:  1. Seb derm  - ketoconazole shampoo, lidex solution  - fluocinolone oil too expesnive  ____________________________________________    Assessment & Plan:     # Seborrheic dermatitis.  Initially patient experienced significant improvement with ketoconazole. Now flaring again with scaling and erythema noted on exam today despite using ketoconazole as prescribed. Will trial topical steroid in addition to ketoconazole.   - Continue ketoconazole shampoo 3x weekly, can alternate with anti-dandruff shampoo if desired  - Start lidex solution daily prn  - Fluocinolone oil too expensive (per mychart sent after visit)    # Hair loss, possible early male pattern baldness. Briefly discussed today, advised could trial Rogaine 5% foam if desired.    Procedures Performed:   None    Follow-up: 4-6 weeks if not improving, send message with issues    Staff and Resident:     Parish Quintana MD  [facility] Resident  Pager# [phone]    Precepted with Dr. East    Staff Physician Comments:   I saw and evaluated the patient with the resident and I agree with the assessment and plan.  I was present for the examination.    Loni East MD    Department of Dermatology  [facility]: Phone: [phone], Fax: [phone]  5/17/2021     ____________________________________________    CC: Derm Problem (Edwardo is here today for my scalp. He states \" I am using the shampoo that seemed to help for 6 months and in the last month it started flaring again\")    HPI:  Mr. Edwardo Hope is a(n) 30 year old male who presents today for follow-up  for seb derm    Seb derm follow up:  -Using the ketoconazole 5 days a week (previously was using 3 times )  -Applying the ketoconazole for 3-5 minutes then washes off  -Said that " right after starting the ketoconazole he noticed significant improvement. This only lasted around 2-3 months.  -Flared up again in 3/2021  -Not currently using other dandruff shampoos  -Will get flakes in his beard as well  -Will notice some mild redness  -Still very itchy    Patient is otherwise feeling well, without additional skin concerns.    Labs Reviewed:N/A    Physical Exam:  Vitals: There were no vitals taken for this visit.  SKIN: Focused examination of scalp was performed.  - There is macular erythema of the lateral scalp with moderate flaky white scale..   - No other lesions of concern on areas examined.     Medications:  Current Outpatient Medications   Medication     ketoconazole (NIZORAL) 2 % external shampoo     No current facility-administered medications for this visit.       Past Medical History:   There is no problem list on file for this patient.    Past Medical History:   Diagnosis Date     Recurrent L ankle sprains        CC No referring provider defined for this encounter. on close of this encounter.

## 2021-05-11 NOTE — NURSING NOTE
"Dermatology Rooming Note    Edwardo Hope's goals for this visit include:   Chief Complaint   Patient presents with     Derm Problem     Edwardo is here today for my scalp. He states \" I am using the shampoo that seemed to help for 6 months and in the last month it started flaring again\"     Soraida Martinez, RMDORY  "

## 2021-05-11 NOTE — LETTER
"5/11/2021       RE: Edwardo Hope  [address] [place] [address]     Dear Colleague,    Thank you for referring your patient, Edwardo Hope, to the [facility] DERMATOLOGY CLINIC [place] at [facility]. Please see a copy of my visit note below.    [facility] Dermatology Note  Encounter Date: May 11, 2021  Office Visit     Dermatology Problem List:  1. Seb derm  - ketoconazole shampoo, lidex solution  - fluocinolone oil too expesnive  ____________________________________________    Assessment & Plan:     # Seborrheic dermatitis.  Initially patient experienced significant improvement with ketoconazole. Now flaring again with scaling and erythema noted on exam today despite using ketoconazole as prescribed. Will trial topical steroid in addition to ketoconazole.   - Continue ketoconazole shampoo 3x weekly, can alternate with anti-dandruff shampoo if desired  - Start lidex solution daily prn  - Fluocinolone oil too expensive (per mychart sent after visit)    # Hair loss, possible early male pattern baldness. Briefly discussed today, advised could trial Rogaine 5% foam if desired.    Procedures Performed:   None    Follow-up: 4-6 weeks if not improving, send message with issues    Staff and Resident:     Parish Quintana MD  [facility]  Pager# [phone]    Precepted with Dr. East    Staff Physician Comments:   I saw and evaluated the patient with the resident and I agree with the assessment and plan.  I was present for the examination.    Loni East MD    Department of Dermatology  [facility]: Phone: [phone], Fax: [phone]  5/17/2021     ____________________________________________    CC: Derm Problem (Edwardo is here today for my scalp. He states \" I am using the shampoo that seemed to help for 6 months " "and in the last month it started flaring again\")    HPI:  Mr. Edwardo Hope is a(n) 30 year old male who presents today for follow-up  for seb derm    Seb derm follow up:  -Using the ketoconazole 5 days a week (previously was using 3 times )  -Applying the ketoconazole for 3-5 minutes then washes off  -Said that right after starting the ketoconazole he noticed significant improvement. This only lasted around 2-3 months.  -Flared up again in 3/2021  -Not currently using other dandruff shampoos  -Will get flakes in his beard as well  -Will notice some mild redness  -Still very itchy    Patient is otherwise feeling well, without additional skin concerns.    Labs Reviewed:N/A    Physical Exam:  Vitals: There were no vitals taken for this visit.  SKIN: Focused examination of scalp was performed.  - There is macular erythema of the lateral scalp with moderate flaky white scale..   - No other lesions of concern on areas examined.     Medications:  Current Outpatient Medications   Medication     ketoconazole (NIZORAL) 2 % external shampoo     No current facility-administered medications for this visit.       Past Medical History:   There is no problem list on file for this patient.    Past Medical History:   Diagnosis Date     Recurrent L ankle sprains        CC No referring provider defined for this encounter. on close of this encounter.      "

## 2021-05-12 RX ORDER — FLUOCINONIDE TOPICAL SOLUTION USP, 0.05% 0.5 MG/ML
SOLUTION TOPICAL 2 TIMES DAILY
Qty: 60 ML | Refills: 3 | Status: SHIPPED | OUTPATIENT
Start: 2021-05-12 | End: 2022-10-03

## 2021-09-14 DIAGNOSIS — L21.9 SEBORRHEIC DERMATITIS: ICD-10-CM

## 2021-09-16 RX ORDER — KETOCONAZOLE 20 MG/ML
SHAMPOO TOPICAL
Qty: 120 ML | Refills: 7 | Status: SHIPPED | OUTPATIENT
Start: 2021-09-16 | End: 2022-10-03

## 2021-09-16 NOTE — TELEPHONE ENCOUNTER
Last Clinic Visit: 5/11/2021  [facility] Dermatology [facility] [place]    Refilled qty to 12 months from last visit (process #1/Derm protocol).

## 2021-10-10 ENCOUNTER — HEALTH MAINTENANCE LETTER (OUTPATIENT)
Age: 31
End: 2021-10-10

## 2022-01-30 ENCOUNTER — HEALTH MAINTENANCE LETTER (OUTPATIENT)
Age: 32
End: 2022-01-30

## 2022-03-21 ENCOUNTER — TELEPHONE (OUTPATIENT)
Dept: FAMILY MEDICINE | Facility: CLINIC | Age: 32
End: 2022-03-21
Payer: COMMERCIAL

## 2022-03-21 ENCOUNTER — MYC MEDICAL ADVICE (OUTPATIENT)
Dept: INTERNAL MEDICINE | Facility: CLINIC | Age: 32
End: 2022-03-21
Payer: COMMERCIAL

## 2022-03-21 NOTE — TELEPHONE ENCOUNTER
[facility] Call Center    Phone Message    May a detailed message be left on voicemail: yes     Reason for Call: Order(s): Other:   Reason for requested: Labs before physical  Date needed: before 5/25/22  Provider name: Ofstedal      Action Taken: Message routed to:  [facility]    Travel Screening: Not Applicable

## 2022-03-23 ENCOUNTER — OFFICE VISIT (OUTPATIENT)
Dept: FAMILY MEDICINE | Facility: CLINIC | Age: 32
End: 2022-03-23
Payer: COMMERCIAL

## 2022-03-23 VITALS
WEIGHT: 148 LBS | HEART RATE: 71 BPM | DIASTOLIC BLOOD PRESSURE: 83 MMHG | OXYGEN SATURATION: 99 % | BODY MASS INDEX: 24.63 KG/M2 | SYSTOLIC BLOOD PRESSURE: 111 MMHG

## 2022-03-23 DIAGNOSIS — Z00.00 HEALTH CARE MAINTENANCE: Primary | ICD-10-CM

## 2022-03-23 DIAGNOSIS — D17.30 LIPOMA OF SKIN AND SUBCUTANEOUS TISSUE: ICD-10-CM

## 2022-03-23 PROCEDURE — 99395 PREV VISIT EST AGE 18-39: CPT | Performed by: FAMILY MEDICINE

## 2022-03-23 ASSESSMENT — PAIN SCALES - GENERAL: PAINLEVEL: MILD PAIN (2)

## 2022-03-23 NOTE — PROGRESS NOTES
Assessment & Plan     Health care maintenance  Recheck. Discussed diet exercise.   - Lipid panel reflex to direct LDL Fasting; Future    Lipoma of skin and subcutaneous tissue  A bit sore w/ pressure, so reasonable to remove  - Adult General Surg Referral      35 minutes spent on the date of the encounter doing chart review, history and exam, documentation and further activities per the note    Vinayak Whiting MD  [facility] PRIMARY CARE CLINIC [place]    John Rodriguez is a 31 year old who presents for the following health issues    HPI   Back cyst: slowly bigger a bit sore at times if pressure on, no drg, a few years bigger    Ear wax no symptoms or history    Mom: Raynauds, RA. He's often had slight Raynauds like sx of feet but nothing limiting even in winter. Not in hands. Worse w/ presume covid two years ago, likely had covid toes. A bit worse since, still not limiting.   No sun rash, no hand/foot sx otherwise eg stiffness    Diet exercise varies.  no kids. [employer] PhD student.     Sees Derm for Seb derm see notes            Past Medical History:   Diagnosis Date     Recurrent L ankle sprains      Past Surgical History:   Procedure Laterality Date     ARTHROSCOPY KNEE Right 2012     HERNIA REPAIR Right 2008    inguinal     Current Outpatient Medications   Medication     fluocinolone acetonide (DERMA SMOOTHE/FS BODY) 0.01 % external oil     fluocinonide (LIDEX) 0.05 % external solution     ketoconazole (NIZORAL) 2 % external shampoo     No current facility-administered medications for this visit.     No Known Allergies  No family history on file.   Mom: raynauds, RA, thyroid  Dad: htn and hi chol  Sibs: 1, alive well    Kids: no         PhD student in literature    Social History     Tobacco Use     Smoking status: Never Smoker     Smokeless tobacco: Never Used   Substance Use Topics     Alcohol use: None     Drug use: None     Etoh social      Review of Systems   Ten pt ROS  otherwise negative      Objective    /87 (BP Location: Right arm, Patient Position: Sitting, Cuff Size: Adult Regular)   Pulse 71   Wt 67.1 kg (148 lb)   SpO2 99%   BMI 24.63 kg/m    Body mass index is 24.63 kg/m .  Physical Exam   GENERAL: healthy, alert and no distress  EYES: Eyes grossly normal to inspection, PERRL and conjunctivae and sclerae normal  HENT: ear canals and TM's normal, nose and mouth without ulcers or lesions  NECK: no adenopathy, no asymmetry, masses, or scars and thyroid normal to palpation  RESP: lungs clear to auscultation - no rales, rhonchi or wheezes  CV: regular rate and rhythm, normal S1 S2, no S3 or S4, no murmur, click or rub, no peripheral edema and peripheral pulses strong  ABDOMEN: soft, nontender, no hepatosplenomegaly, no masses and bowel sounds normal   (male): normal male genitalia without lesions or urethral discharge, no hernia  MS: no gross musculoskeletal defects noted, no edema  SKIN: no suspicious lesions or rashes, left upper back 3 inch subderm soft mobile mass, mild tender if pressure on  NEURO: Normal strength and tone, mentation intact and speech normal  PSYCH: mentation appears normal, affect normal/bright

## 2022-03-23 NOTE — PATIENT INSTRUCTIONS
Thank you for visiting the Primary Care Center today at the [facility]! The following is some information about our clinic:     Primary Care Center Frequently-Asked Questions    (1) How do I schedule appointments at the [facility]?     Primary Care--to schedule or make changes to an existing appointment, please call our primary care line at [phone].    Labs--to schedule a lab appointment at the [facility] you can use [employer] or call [phone]. If you have a [place] location that is closer to home, you can reach out to that location for scheduling options.     Imaging--if you need to schedule a CT, X-ray, MRI, ultrasound, or other imaging study you can call [phone] to schedule at the [facility] or any other [facility] imaging location.     Referrals--if a referral to another specialty was ordered you can expect a phone call from their scheduling team. If you have not heard from them in a week, please call us or send us a [employer] message to check the status or get a scheduling number. Please note that this only applies to internal [facility] referrals. If the referral is external you would need to contact their office for scheduling.     (2) I have a question about my visit, who do I contact?     You can call us at the primary care line at [phone] to ask questions about your visit. You can also send a secure message through [employer], which is reviewed by clinic staff. Please note that [employer] messages have a twenty-four to forty-eight business hour turnaround time and should not be used for urgent concerns.    (3) How will I get the results of my tests?    If you are signed up for [employer] all tests will be released to you within twenty-four hours of resulting. Please allow three to five days for your doctor to review your results and place a note interpreting the results. If you do not have [employer] you will receive your  results through mail seven to ten business days following the return of the tests. Please note that if there should be any urgent or concerning results that your doctor or their registered nurse will reach out to you the same day as the tests come back. If you have follow up questions about your results or would like to discuss the results in detail please schedule a follow up with your provider either in person or virtually.     (4) How do I get refills of my prescriptions?     You should always first contact your pharmacy for refills of your medications. If submitting a refill request on [employer], please be sure to submit the request only once--repeat requests can cause delays in refill. If you are requesting a NEW medication or a medication related to new symptoms you will need to schedule an appointment with a provider prior to approval. Please note: Routine medication refills have up to one to three business day turnaround whereas controlled substances refills have up to five to seven business day turnaround.    (5) I have new symptoms, what do I do?     If you are having an immediate medical emergency, you should dial 911 for assistance.   For anything urgent that needs to be seen within a few hours to one day you should visit a local urgent care for assistance.  For non-urgent symptoms that need to be seen within a few days to a week you can schedule with an available provider in primary care by going to [employer] or calling [phone].   If you are not sure how serious your symptoms are or you would like to receive medical advice you can always call [phone] to speak with a triage nurse.

## 2022-03-23 NOTE — TELEPHONE ENCOUNTER
REFERRAL INFORMATION:    Referring Provider:  Dr. Vinayak Whiting     Referring Clinic:  [facility]     Reason for Visit/Diagnosis: Lipoma of skin        FUTURE VISIT INFORMATION:    Appointment Date: 3/25/2022    Appointment Time: 11:45 AM      NOTES RECORD STATUS  DETAILS   OFFICE NOTE from Referring Provider Internal 3/23/2022 Office visit with Dr. Whiting     OFFICE NOTE from Other Specialists N/A    HOSPITAL DISCHARGE SUMMARY/ ED VISITS  N/A    OPERATIVE REPORT N/A    ENDOSCOPY (EGD)  N/A    PERTINENT LABS Internal    PATHOLOGY REPORTS (RELATED) N/A    IMAGING (CT, MRI, US, XR)  N/A

## 2022-03-23 NOTE — NURSING NOTE
Chief Complaint   Patient presents with     Physical     discuss cholestoral     Derm Problem     on his back causing aching pain, low level, noticed first 3 years ago       VICTORINA Hernandez at 8:31 AM on 3/23/2022

## 2022-03-25 ENCOUNTER — PRE VISIT (OUTPATIENT)
Dept: SURGERY | Facility: CLINIC | Age: 32
End: 2022-03-25

## 2022-03-25 ENCOUNTER — OFFICE VISIT (OUTPATIENT)
Dept: SURGERY | Facility: CLINIC | Age: 32
End: 2022-03-25
Attending: FAMILY MEDICINE
Payer: COMMERCIAL

## 2022-03-25 VITALS
BODY MASS INDEX: 23.97 KG/M2 | HEIGHT: 65 IN | WEIGHT: 143.9 LBS | DIASTOLIC BLOOD PRESSURE: 78 MMHG | SYSTOLIC BLOOD PRESSURE: 119 MMHG | HEART RATE: 71 BPM | OXYGEN SATURATION: 99 %

## 2022-03-25 DIAGNOSIS — D17.1 LIPOMA OF BACK: Primary | ICD-10-CM

## 2022-03-25 PROCEDURE — 99204 OFFICE O/P NEW MOD 45 MIN: CPT | Performed by: SURGERY

## 2022-03-25 ASSESSMENT — PAIN SCALES - GENERAL: PAINLEVEL: MILD PAIN (3)

## 2022-03-25 NOTE — NURSING NOTE
"Chief Complaint   Patient presents with     New Patient     Lipoma of skin       Vitals:    03/25/22 1143   BP: 119/78   BP Location: Left arm   Patient Position: Sitting   Cuff Size: Adult Regular   Pulse: 71   SpO2: 99%   Weight: 65.3 kg (143 lb 14.4 oz)   Height: 1.651 m (5' 5\")       Body mass index is 23.95 kg/m .                          Erica Peñaloza, EMT    "

## 2022-03-25 NOTE — LETTER
3/25/2022       RE: Edwardo Hope  [address]  [facility] [address]     Dear Colleague,    Thank you for referring your patient, Edwardo Hope, to the [facility] GENER[facility] SURGERY [facility]IN[facility]. Please see a copy of my visit note below.    General Surgery Clinic Note - New Patient Visit    NAME: Edwardo Hope,  31 year old male    PCP: Vinayak Whiting  MRN:   [id number]      #: [phone]  Date: Mar 25, 2022    Chief Complaint: lipoma    History of Present Illness: Edwardo Hope is a 31 year old male who presents to the clinic with a lipoma of the left back.  It is a bit larger and has begun to boteher him a bit so he would like to have it removed.  He is otherwise healthy.  He denies bleeding problems.      Past Medical History: History in Epic reviewed with the patient:  Past Medical History:   Diagnosis Date     Recurrent L ankle sprains        Past Surgical History: History in Epic reviewed with the patient:  Past Surgical History:   Procedure Laterality Date     ARTHROSCOPY KNEE Right 2012     HERNIA REPAIR Right 2008    inguinal       Family History: History in Epic reviewed with the patient:  No family history on file.    Social History: History in Epic reviewed with the patient:  Social History     Socioeconomic History     Marital status:      Spouse name: Not on file     Number of children: Not on file     Years of education: Not on file     Highest education level: Not on file   Occupational History     Not on file   Tobacco Use     Smoking status: Never Smoker     Smokeless tobacco: Never Used   Substance and Sexual Activity     Alcohol use: Not on file     Drug use: Not on file     Sexual activity: Not on file   Other Topics Concern     Not on file   Social History Narrative     Not on file     Social Determinants of Health     Financial Resource Strain: Not on file   Food Insecurity: Not on file  "  Transportation Needs: Not on file   Physical Activity: Not on file   Stress: Not on file   Social Connections: Not on file   Intimate Partner Violence: Not on file   Housing Stability: Not on file       Allergies:   No Known Allergies    Outpatient Medications:  Outpatient Encounter Medications as of 3/25/2022   Medication Sig Dispense Refill     fluocinolone acetonide (DERMA SMOOTHE/FS BODY) 0.01 % external oil Apply topically 2 times daily 118.25 mL 3     fluocinonide (LIDEX) 0.05 % external solution Apply topically 2 times daily To scalp as needed 60 mL 3     ketoconazole (NIZORAL) 2 % external shampoo MASSAGE INTO WET SCALP, LET SIT 3 TO 5 MINUTES, THEN RINSE. DO THIS 3 TIMES WEEKLY. 120 mL 7     No facility-administered encounter medications on file as of 3/25/2022.       ROS: 10 systems reviewed and all are negative except as above.    EXAM:  /78 (BP Location: Left arm, Patient Position: Sitting, Cuff Size: Adult Regular)   Pulse 71   Ht 1.651 m (5' 5\")   Wt 65.3 kg (143 lb 14.4 oz)   SpO2 99%   BMI 23.95 kg/m    Psych: Normal affect  Neuro: No gross focal deficits noted  Head:Normocephalic, atraumatic  Eyes: Non icteric  Neck:supple  Heart: Regular rate and rhythm  Lungs: non-labored, quiet respiration  Trunk: [place] sized mass on the left upper back.    Extremities: No obvious deformities    Imaging Data (I have personally reviewed the following reports):  No results found for this or any previous visit (from the past 744 hour(s)).    A/P: Edwardo Hope is a 31 year old male with a probable lipoma of the left upper back.  Excision is recommended.  Risks, benefits and alternatives are discussed and the patient agrees to proceed.  This can be done with local/MAC in the ACS and with take about 45 mins.      Thao Dejesus MD FACS  Professor of Surgery  Pager [phone]   "

## 2022-03-25 NOTE — PROGRESS NOTES
General Surgery Clinic Note - New Patient Visit    NAME: Edwardo Hope,  31 year old male    PCP: Vinayak Whiting  MRN:   [id number]      #: [phone]  Date: Mar 25, 2022    Chief Complaint: lipoma    History of Present Illness: Edwardo Hope is a 31 year old male who presents to the clinic with a lipoma of the left back.  It is a bit larger and has begun to boteher him a bit so he would like to have it removed.  He is otherwise healthy.  He denies bleeding problems.      Past Medical History: History in Epic reviewed with the patient:  Past Medical History:   Diagnosis Date     Recurrent L ankle sprains        Past Surgical History: History in Epic reviewed with the patient:  Past Surgical History:   Procedure Laterality Date     ARTHROSCOPY KNEE Right 2012     HERNIA REPAIR Right 2008    inguinal       Family History: History in Epic reviewed with the patient:  No family history on file.    Social History: History in Epic reviewed with the patient:  Social History     Socioeconomic History     Marital status:      Spouse name: Not on file     Number of children: Not on file     Years of education: Not on file     Highest education level: Not on file   Occupational History     Not on file   Tobacco Use     Smoking status: Never Smoker     Smokeless tobacco: Never Used   Substance and Sexual Activity     Alcohol use: Not on file     Drug use: Not on file     Sexual activity: Not on file   Other Topics Concern     Not on file   Social History Narrative     Not on file     Social Determinants of Health     Financial Resource Strain: Not on file   Food Insecurity: Not on file   Transportation Needs: Not on file   Physical Activity: Not on file   Stress: Not on file   Social Connections: Not on file   Intimate Partner Violence: Not on file   Housing Stability: Not on file       Allergies:   No Known Allergies    Outpatient Medications:  Outpatient Encounter Medications as of 3/25/2022   Medication Sig  "Dispense Refill     fluocinolone acetonide (DERMA SMOOTHE/FS BODY) 0.01 % external oil Apply topically 2 times daily 118.25 mL 3     fluocinonide (LIDEX) 0.05 % external solution Apply topically 2 times daily To scalp as needed 60 mL 3     ketoconazole (NIZORAL) 2 % external shampoo MASSAGE INTO WET SCALP, LET SIT 3 TO 5 MINUTES, THEN RINSE. DO THIS 3 TIMES WEEKLY. 120 mL 7     No facility-administered encounter medications on file as of 3/25/2022.       ROS: 10 systems reviewed and all are negative except as above.    EXAM:  /78 (BP Location: Left arm, Patient Position: Sitting, Cuff Size: Adult Regular)   Pulse 71   Ht 1.651 m (5' 5\")   Wt 65.3 kg (143 lb 14.4 oz)   SpO2 99%   BMI 23.95 kg/m    Psych: Normal affect  Neuro: No gross focal deficits noted  Head:Normocephalic, atraumatic  Eyes: Non icteric  Neck:supple  Heart: Regular rate and rhythm  Lungs: non-labored, quiet respiration  Trunk: [place] sized mass on the left upper back.    Extremities: No obvious deformities    Imaging Data (I have personally reviewed the following reports):  No results found for this or any previous visit (from the past 744 hour(s)).    A/P: Edwardo Hope is a 31 year old male with a probable lipoma of the left upper back.  Excision is recommended.  Risks, benefits and alternatives are discussed and the patient agrees to proceed.  This can be done with local/MAC in the ACS and with take about 45 mins.      Thao Dejesus MD [facility]  Professor of Surgery  Pager [phone]     "

## 2022-03-25 NOTE — NURSING NOTE
Pre and Post op Patient Education/Teaching Flowsheet  Relevant Diagnosis:  Mass  Teaching Topic:  Pre and post op teaching  Person(s) Involved in teaching:  Patient     Motivation Level:  Asks Questions:  Yes  Eager to Learn:  Yes  Cooperative:  Yes  Receptive (willing/able to accept information):  Yes  Any cultural factors/[employer]aritan beliefs that may influence understanding or compliance?  No    Patient/caregiver/family demonstrates understanding of the following:  Reason for the appointment, diagnosis, and treatment plan:  Yes  Patient demonstrates understanding of the following:  Pre-op bowel prep:  No  Post-op pain management recommendations (medications, ice compress, binder/athletic supporter (if applicable), etc.:  Yes  Inguinal hernia patients:  Post-op urinary retention- discussed signs/symptoms and visit to ER for [place] catheter placement and to stay in place for at least 48 hours:  NA  Restrictions:  NA  Medications to take the day of surgery:  Per PCP  Blood thinner medications discussed and when to stop (if applicable):  Yes  Wound care:  Yes  Diabetes medication management (if applicable):  Per PCP  Which situations necessitate calling provider and whom to contact:  Discussed how to contact the hospital, nurse, and clinic scheduling staff if necessary      Date and time of surgery:  TBD  Location of surgery: [facility]- 5th Floor  History and Physical and any other testing necessary prior to surgery:  Yes- Surgeon at time of procedure  Required time line for completion of History and Physical and any pre-op testing:  Yes  Discuss need for someone to drive patient home and stay with them for 24 hours:  No  Pre-op showering/scrub information with Surgical Scrub:  Yes  NPO Guidelines:  No restrictions  COVID-19 Testing:  Yes    Infection Prevention: Patient demonstrates understanding of the following:  Patient instructed on hand hygiene:  Yes  Surgical procedure site care  will be taught and will be reviewed at the time of discharge  Signs and symptoms of infection taught:  Yes  Wound care reviewed and will be taught at the time of discharge  Central venous catheter care will be taught at the time of discharge (if applicable)    Post-op follow-up:  Instructional materials used/given/mailed:  Surgical logistics, post op teaching sheet, and surgical scrub

## 2022-03-25 NOTE — PATIENT INSTRUCTIONS
You met with Dr. Thao Dejesus.      Today's visit instructions:    Reminder:  Surgery Requirements  1. Your surgery will be at the [facility] [place]- 5th Floor  2. You will need to arrive 1 hour early based on the location of your surgery.  3. You will need a Pre-op physical before your procedure- your surgeon will do this the day of surgery.   4. Stop any blood thinners, vitamins, minerals, or herbal supplements 5 days before surgery.  If you are taking a prescribed blood thinner please let us know for specific instructions.  5. You may eat/drink/drive without restrictions/limitations.  6. Wash with the soap (Antibacterial, Dial Complete Foam, Hibiclense, or soap given/mailed from the clinic) the night before and morning of surgery. See instructions in the Surgery Packet.  7. You will need to undergo a COVID-19 test 2-4 days prior to your scheduled procedure.  Our surgery scheduler will help arrange testing.  8. If you would like a procedure estimate please call Mirtha CHAPARRO Financial Counselor at [phone] or [phone].    At this time, any patient that does not have COVID-19 testing within 4 days of surgery and results available to the surgeon and anesthesia team before the procedure may have their procedure postponed or canceled. We do this to keep our patients, providers, and employees safe. If you decline to test, then you will need to contact your surgeon to determine when or if your procedure will still take place.    OR    We highly encourage patients to get tested for COVID-19 at one of our designated [facility] testing sites. We process the tests in our lab, which allows us to get the results quickly. If you choose to get tested at a non-[facility] location, you will need to contact your primary care provider to make those arrangements and ensure the results are available to your surgeon before you arrive for your procedure. If we do not receive the  results in time, your procedure may be postponed or canceled. Please make sure your test is collected 3-days prior to your procedure date. The results will need to get faxed to [phone].     If you have questions please contact Patria RN or Ivett RN during regular clinic hours, Monday through Friday 7:30 AM - 4:00 PM, or you can contact us via Zencoder at anytime.       If you have urgent needs after-hours, weekends, or holidays please call the hospital at [phone] and ask to speak with our on-call General Surgery Team.    Appointment [phone]  Nurse Advice (Patria or Ivett): [phone]   Surgery Scheduler (Michael): [phone]  Fax: [phone]      After Your Mass/Lump Removal       Incision care     You may take a shower the day after surgery. Carefully wash your incision with soap and water. Do not submerge yourself in water (bath, whirlpool, hot tub, pool, lake) for 14 days after surgery.     Remove the gauze bandage 24 hours after surgery, but leave the medical tape (Steri-Strips) or glue in place. These will loosen and fall off on their own 1-2 weeks after surgery.       Always wash your hands before touching your incisions or removing bandages.     It is not unusual to form a collection of fluid or blood under your incision that may feel firm or squishy- it can take several weeks to months for your body to reabsorb it.  At times, it may even drain.  If that should happen keep the area clean with soap, water,  and cover with a clean gauze dressing. You can change this daily or as needed.     Other medicines     Wait to start aspirin or blood thinners until the day after surgery. You can continue your regular medicines at your normal time the day after surgery.     Your pain medicine may cause constipation (hard, dry stools). To help with this, take the stool softener your doctor gave you or an over-the-counter stool softener or laxative. You can stop taking this when you are no longer  taking pain medicine and your bowel movements are back to normal.      For pain or discomfort     Take the narcotic pain medicine your doctor gave you as needed and as instructed on the bottle. If you prefer to use over-the-counter medication, use acetaminophen (Tylenol) or ibuprofen (Advil, Motrin) as instructed on the box. Do not take Tylenol if it is in your narcotic pain medication.      Use an ice pack on your surgical cut (incision) for 20 minutes at a time as needed for the first 24 hours. Be sure to protect your skin by putting a cloth between the ice pack and your skin.      Activities   No driving until you feel it s safe to do so. Don t drive while taking narcotic pain medicine.      Diet   You can eat your regular meals after surgery.      Results   You should know any test results about 5 to 7 business days after surgery       When to call the doctor   Call your doctor if you have:     A fever above 101 F (38.3 C) (taken under the tongue), or a fever or chills lasting more than a day.     Redness at the incision site.     Any fluid or blood draining from the incision, especially if it smells bad.      Severe pain that doesn t improve with pain medicine.      We will call you 2 to 4 days after surgery to review this handout, answer questions and help arrange after-surgery care. If you have questions or concerns, please call [phone] during regular office hours. If you need to call after business hours, call [phone] and ask to page the surgeon on-call.

## 2022-03-29 ENCOUNTER — TELEPHONE (OUTPATIENT)
Dept: SURGERY | Facility: CLINIC | Age: 32
End: 2022-03-29
Payer: COMMERCIAL

## 2022-03-29 PROBLEM — D17.1 LIPOMA OF BACK: Status: ACTIVE | Noted: 2022-03-29

## 2022-03-29 NOTE — TELEPHONE ENCOUNTER
Patient is scheduled for surgery with Dr. Dejesus    Spoke with: Michael    Date of Surgery: 5/25/22    Location: [facility]    Informed patient they will need an adult  yes    Pre op with Provider n/a    H&P: To be done by surgeon the day of surgery    Pre-procedure COVID-19 Test: 5/21/22    Additional imaging/appointments: n/a    Surgery packet: Sent via Lightswitch     Additional comments: n/a

## 2022-03-30 ENCOUNTER — LAB (OUTPATIENT)
Dept: LAB | Facility: CLINIC | Age: 32
End: 2022-03-30
Attending: FAMILY MEDICINE
Payer: COMMERCIAL

## 2022-03-30 DIAGNOSIS — D17.1 LIPOMA OF BACK: ICD-10-CM

## 2022-03-30 DIAGNOSIS — Z00.00 HEALTH CARE MAINTENANCE: ICD-10-CM

## 2022-03-30 LAB
CHOLEST SERPL-MCNC: 199 MG/DL
FASTING STATUS PATIENT QL REPORTED: YES
HDLC SERPL-MCNC: 51 MG/DL
LDLC SERPL CALC-MCNC: 131 MG/DL
NONHDLC SERPL-MCNC: 148 MG/DL
SARS-COV-2 RNA RESP QL NAA+PROBE: NEGATIVE
TRIGL SERPL-MCNC: 84 MG/DL

## 2022-03-30 PROCEDURE — U0003 INFECTIOUS AGENT DETECTION BY NUCLEIC ACID (DNA OR RNA); SEVERE ACUTE RESPIRATORY SYNDROME CORONAVIRUS 2 (SARS-COV-2) (CORONAVIRUS DISEASE [COVID-19]), AMPLIFIED PROBE TECHNIQUE, MAKING USE OF HIGH THROUGHPUT TECHNOLOGIES AS DESCRIBED BY CMS-2020-01-R: HCPCS | Mod: 90 | Performed by: PATHOLOGY

## 2022-03-30 PROCEDURE — 36415 COLL VENOUS BLD VENIPUNCTURE: CPT | Performed by: PATHOLOGY

## 2022-03-30 PROCEDURE — 99000 SPECIMEN HANDLING OFFICE-LAB: CPT | Performed by: PATHOLOGY

## 2022-03-30 PROCEDURE — U0005 INFEC AGEN DETEC AMPLI PROBE: HCPCS | Mod: 90 | Performed by: PATHOLOGY

## 2022-03-30 PROCEDURE — 80061 LIPID PANEL: CPT | Performed by: PATHOLOGY

## 2022-04-19 DIAGNOSIS — Z11.59 ENCOUNTER FOR SCREENING FOR OTHER VIRAL DISEASES: Primary | ICD-10-CM

## 2022-05-21 ENCOUNTER — LAB (OUTPATIENT)
Dept: LAB | Facility: CLINIC | Age: 32
End: 2022-05-21
Attending: SURGERY
Payer: COMMERCIAL

## 2022-05-21 DIAGNOSIS — Z11.59 ENCOUNTER FOR SCREENING FOR OTHER VIRAL DISEASES: ICD-10-CM

## 2022-05-21 LAB — SARS-COV-2 RNA RESP QL NAA+PROBE: NEGATIVE

## 2022-05-21 PROCEDURE — 99000 SPECIMEN HANDLING OFFICE-LAB: CPT | Performed by: PATHOLOGY

## 2022-05-21 PROCEDURE — U0003 INFECTIOUS AGENT DETECTION BY NUCLEIC ACID (DNA OR RNA); SEVERE ACUTE RESPIRATORY SYNDROME CORONAVIRUS 2 (SARS-COV-2) (CORONAVIRUS DISEASE [COVID-19]), AMPLIFIED PROBE TECHNIQUE, MAKING USE OF HIGH THROUGHPUT TECHNOLOGIES AS DESCRIBED BY CMS-2020-01-R: HCPCS | Mod: 90 | Performed by: PATHOLOGY

## 2022-05-21 PROCEDURE — U0005 INFEC AGEN DETEC AMPLI PROBE: HCPCS | Mod: 90 | Performed by: PATHOLOGY

## 2022-05-25 ENCOUNTER — HOSPITAL ENCOUNTER (OUTPATIENT)
Facility: AMBULATORY SURGERY CENTER | Age: 32
Discharge: HOME | End: 2022-05-25
Attending: SURGERY | Admitting: SURGERY
Payer: COMMERCIAL

## 2022-05-25 VITALS
HEIGHT: 65 IN | HEART RATE: 76 BPM | BODY MASS INDEX: 24.16 KG/M2 | RESPIRATION RATE: 16 BRPM | DIASTOLIC BLOOD PRESSURE: 66 MMHG | TEMPERATURE: 97.5 F | WEIGHT: 145 LBS | OXYGEN SATURATION: 100 % | SYSTOLIC BLOOD PRESSURE: 132 MMHG

## 2022-05-25 DIAGNOSIS — D17.1 LIPOMA OF BACK: ICD-10-CM

## 2022-05-25 PROCEDURE — 88304 TISSUE EXAM BY PATHOLOGIST: CPT | Mod: 26 | Performed by: PATHOLOGY

## 2022-05-25 PROCEDURE — 21931 EXC BACK LES SC 3 CM/>: CPT | Mod: GC | Performed by: SURGERY

## 2022-05-25 PROCEDURE — 21931 EXC BACK LES SC 3 CM/>: CPT

## 2022-05-25 PROCEDURE — 88304 TISSUE EXAM BY PATHOLOGIST: CPT | Mod: TC | Performed by: SURGERY

## 2022-05-25 RX ORDER — IBUPROFEN 400 MG/1
400 TABLET, FILM COATED ORAL EVERY 6 HOURS PRN
COMMUNITY
Start: 2022-05-25 | End: 2023-10-11

## 2022-05-25 RX ORDER — ACETAMINOPHEN 325 MG/1
650 TABLET ORAL EVERY 6 HOURS PRN
Qty: 50 TABLET | Refills: 0 | COMMUNITY
Start: 2022-05-25 | End: 2023-10-11

## 2022-05-25 RX ORDER — ACETAMINOPHEN 325 MG/1
650 TABLET ORAL
Status: DISCONTINUED | OUTPATIENT
Start: 2022-05-25 | End: 2022-05-26 | Stop reason: HOSPADM

## 2022-05-25 NOTE — OP NOTE
Operative Note  PreOp Dx: Lipoma of left back  PostOp Dx: Lipoma of left back  Procedure: Excision of left back mass  Surgeon: Thao Dejesus MD   Resident: Radha Johnston MD  Anesethesia: Local   EBL: 2ml  Comorbidities:   Patient Active Problem List   Diagnosis     Lipoma of back      Indications: Edwardo Hope is a 31 year old male  who presented with a 4cm x 3cm cm mass on his left back. Excision is recommended. Risks, benefits and alternatives are discussed and the patient agrees to proceed as planned.   Procedure: The patient was brought to the operating room and placed in a comfortable right lateral decubitus position. All pressure points were padded. The region was prepped and draped in a sterile fashion.  The mass was readily identified. Local anesthesia was established with 0.25% Marcaine and 1% Lidocaine with epinephrine. A 4cm incision was made with a scalpel over the area. Dissection was carried down to the mass with a combination of blunt and sharp dissection. The mass was sent to Pathology.  Hemostasis was assured. The wound was closed in layers. Two 3-0 vicryl figure of eight sutures were used to reapproximate the muscle fascial layer. The skin was then closed with 3-0 vicryl deep dermals and a 4-0 monocryl running subcuticular. Dermabond was applied over the incision.  The patient tolerated the procedure well and was discharged from the recovery area in good condition.

## 2022-05-25 NOTE — BRIEF OP NOTE
[facility]    Brief Operative Note    Pre-operative diagnosis: Lipoma of back [D17.1]  Post-operative diagnosis Same as pre-operative diagnosis    Procedure: Procedure(s):  EXCISION, MASS, BACK, LEFT  Surgeon: Surgeon(s) and Role:     * Thao Dejesus MD - Primary  Anesthesia: Local   Estimated Blood Loss: Less than 10 ml    Drains: None  Specimens:   ID Type Source Tests Collected by Time Destination   1 : Left Back Mass Tissue Back, Upper SURGICAL PATHOLOGY EXAM Thao Dejesus MD 5/25/2022 10:22 AM      Findings:   Approximately 4cm x 3cm lipoma adherent to muscle fascia posteriorly removed.  Complications: None.  Implants: * No implants in log *

## 2022-05-25 NOTE — H&P
Outpatient General Surgery H&P    Edwardo Hope MRN# [id number]   Age: 31 year old YOB: 1990     Date:  5/25/2022         Assessment and Plan:   Assessment:    31 y.o. male with probable lipoma of his left upper back. This has become increasingly bothersome and he would like to get it removed. No changes to his medical history or medications since his last appointment.     - Plan to proceed today with mass excision under local anesthesia.  - Procedure described and risks/complications explained. All questions answered.  - Will discharge from PACU.    Patient discussed with staff, Dr. Oleg Johnston MD (PGY-1)  General Surgery Resident         Chief Complaint:     Mass on L back         History of Present Illness:     31 y.o. male with no significant PMH who presents today for removal of mass on his left back. Reports first noticing this a few years ago but it has grown in size and become more bothersome. Denies chest pain, shortness of breath, abdominal pain, changes to bowel or urinary symptoms.     No other changes to his medical history or medications since his office visit in March.             Past Medical History:     Past Medical History:   Diagnosis Date     Recurrent L ankle sprains           Past Surgical History:     Past Surgical History:   Procedure Laterality Date     ARTHROSCOPY KNEE Right 2012     HERNIA REPAIR Right 2008    inguinal            Social History:     Social History     Tobacco Use     Smoking status: Never Smoker     Smokeless tobacco: Never Used   Substance Use Topics     Alcohol use: Not on file     Work: PhD student          Family History:   History reviewed. No pertinent family history.     No family h/o bleeding/clotting disorders or problems with anesthesia.          Allergies:   No Known Allergies          Medications:     Current Outpatient Medications   Medication Sig     fluocinolone acetonide (DERMA SMOOTHE/FS BODY) 0.01 % external oil Apply  topically 2 times daily     fluocinonide (LIDEX) 0.05 % external solution Apply topically 2 times daily To scalp as needed     ketoconazole (NIZORAL) 2 % external shampoo MASSAGE INTO WET SCALP, LET SIT 3 TO 5 MINUTES, THEN RINSE. DO THIS 3 TIMES WEEKLY.     Current Facility-Administered Medications   Medication     PRE OP antibiotics NOT needed for this surgical procedure.             Review of Systems:     Complete review of systems negative except as documented in HPI          Physical Exam:   All vitals have been reviewed  Temp:  [97.6  F (36.4  C)] 97.6  F (36.4  C)  Pulse:  [66] 66  Resp:  [18] 18  BP: (131)/(73) 131/73  SpO2:  [100 %] 100 %    Physical Exam:   Gen: Sitting up in bed, no acute distress, looks comfortable  HEENT: Normocephalic, atraumatic.   Neck: No JVD  Pulm: Non-labored breathing on room air, no tachypnea  CV: RRR, strong radial pulse  Abd: non-distended  Extremities: warm, well perfused  Neuro: A&Ox3  Psych: appropriate  Skin: no jaundice or rashes. Approximately 3cm mass on left upper back.             Data:   All laboratory data reviewed    Results:  BMPNo lab results found in last 7 days.  CBCNo lab results found in last 7 days.  LFTNo lab results found in last 7 days.  No results for input(s): GLC, BGM in the last 168 hours.    Imaging:  None

## 2022-05-26 LAB
PATH REPORT.COMMENTS IMP SPEC: NORMAL
PATH REPORT.COMMENTS IMP SPEC: NORMAL
PATH REPORT.FINAL DX SPEC: NORMAL
PATH REPORT.GROSS SPEC: NORMAL
PATH REPORT.MICROSCOPIC SPEC OTHER STN: NORMAL
PATH REPORT.RELEVANT HX SPEC: NORMAL
PHOTO IMAGE: NORMAL

## 2022-05-27 ENCOUNTER — PATIENT OUTREACH (OUTPATIENT)
Dept: SURGERY | Facility: CLINIC | Age: 32
End: 2022-05-27
Payer: COMMERCIAL

## 2022-05-27 NOTE — PROGRESS NOTES
Results Communication  Edwardo Hope was contacted with pathology results from date of service 5/25.  Telephone Number Contacted:  [phone] (home)   Ordering Physician:  Dr. Thao Dejesus  Date of Communication: May 27, 2022  Time of Communication: 10:38 AM  Communication Method:  Telephone  Person receiving communication:  Patient  Follow-up Instructions:  Doing well with no concerns. Area without redness, C/D/I. Minimal discomfort, taking Ibuprofen PRN per package instructions. Return to the clinic PRN. Reviewed pathology with the patient.  Patient received and verbalized understanding of results and follow-up instructions.    Pathology:  Case Report   Surgical Pathology Report                         Case: [id number]                                   Authorizing Provider:  Thao Dejesus         Collected:           05/25/2022 10:22 AM                                  MD Cristine                                                                 Ordering Location:     [facility] OR  Received:            05/25/2022 10:54 AM                                  [place]                                                                   Pathologist:           Andrés Latif MD                                                            Specimen:    Back, Upper, Left Back Mass                                                                Final Diagnosis   Soft tissue, left back mass, excision:  - Lipoma

## 2022-09-18 ENCOUNTER — HEALTH MAINTENANCE LETTER (OUTPATIENT)
Age: 32
End: 2022-09-18

## 2022-09-19 DIAGNOSIS — L21.9 SEBORRHEIC DERMATITIS: ICD-10-CM

## 2022-09-22 RX ORDER — KETOCONAZOLE 20 MG/ML
SHAMPOO TOPICAL
Qty: 120 ML | Refills: 7 | OUTPATIENT
Start: 2022-09-22

## 2022-09-22 NOTE — TELEPHONE ENCOUNTER
Pt needs an appt  LCV: 5-11-21  Derm process 1  FYI to scheduling    Pt of Dr. East  Please call to schedule.    Thanks    I do not need any follow up on the scheduling of this appointment unless the patient will no longer be receiving care in our clinic.

## 2022-09-30 ENCOUNTER — TELEPHONE (OUTPATIENT)
Dept: DERMATOLOGY | Facility: CLINIC | Age: 32
End: 2022-09-30

## 2022-09-30 NOTE — TELEPHONE ENCOUNTER
[facility] call and schedule a follow up appointment with Dermatology.

## 2022-10-03 ENCOUNTER — OFFICE VISIT (OUTPATIENT)
Dept: DERMATOLOGY | Facility: CLINIC | Age: 32
End: 2022-10-03
Payer: COMMERCIAL

## 2022-10-03 DIAGNOSIS — L21.9 SEBORRHEIC DERMATITIS: ICD-10-CM

## 2022-10-03 PROCEDURE — 99213 OFFICE O/P EST LOW 20 MIN: CPT | Performed by: PHYSICIAN ASSISTANT

## 2022-10-03 RX ORDER — KETOCONAZOLE 20 MG/ML
SHAMPOO TOPICAL
Qty: 120 ML | Refills: 11 | Status: SHIPPED | OUTPATIENT
Start: 2022-10-03 | End: 2022-11-16

## 2022-10-03 RX ORDER — FLUOCINONIDE TOPICAL SOLUTION USP, 0.05% 0.5 MG/ML
SOLUTION TOPICAL 2 TIMES DAILY
Qty: 60 ML | Refills: 3 | Status: SHIPPED | OUTPATIENT
Start: 2022-10-03 | End: 2023-10-11

## 2022-10-03 ASSESSMENT — PAIN SCALES - GENERAL: PAINLEVEL: NO PAIN (0)

## 2022-10-03 NOTE — PROGRESS NOTES
[facility] Dermatology Note  Encounter Date: Oct 3, 2022  Office Visit     Dermatology Problem List:  1. Seborrheic dermatitis  - Current tx: ketoconazole shampoo, lidex solution  - fluocinolone oil too expensive  ____________________________________________    Assessment & Plan:    # Seborrheic dermatitis,  Well controlled with ketoconazole shampoo and lidex solution. Refilled today.  - Continue ketoconazole shampoo daily  - Continue lidex solution daily prn    Procedures Performed:   None    Follow-up: 1 year(s) in-person, or earlier for new or changing lesions    Staff and Scribe:     Scribe Disclosure:  I, Dewey Sarah, am serving as a scribe to document services personally performed by Marjorie Rachel PA-C based on data collection and the provider's statements to me.     Provider Disclosure:   The documentation recorded by the scribe accurately reflects the services I personally performed and the decisions made by me.    All risks, benefits and alternatives were discussed with patient.  Patient is in agreement and understands the assessment and plan.  All questions were answered.  Sun Screen Education was given.   Return to Clinic annually or sooner as needed.   Marjorie Rachel PA-C   [facility]   ____________________________________________    CC: Derm Problem (Dermatitis follow up.  Medications are effective.  Doing the same since last visit.  )    HPI:  Mr. Edwardo Hope is a(n) 32 year old male who presents today as a return patient for dermatitis follow-up. Last seen in dermatology by Dr. East 5/11/2021, at which time patient was started on lidex solution daily prn for treatment of seborrheic dermatitis.    Today, patient reports that he gets seborrheic dermatitis primarily on the scalp and also behind ears and beard. He has been using ketoconazole shampoo and lidex solution.    Patient is otherwise feeling well, without additional skin  concerns.    Labs Reviewed:  N/A    Physical Exam:  Vitals: There were no vitals taken for this visit.  SKIN: Focused examination of scalp and face was performed.  - Mild erythema and flaking on the right parietal scalp.  - No significant flaking in bearded area.  - No other lesions of concern on areas examined.     Medications:  Current Outpatient Medications   Medication     acetaminophen (TYLENOL) 325 MG tablet     fluocinolone acetonide (DERMA SMOOTHE/FS BODY) 0.01 % external oil     fluocinonide (LIDEX) 0.05 % external solution     ibuprofen (ADVIL/MOTRIN) 400 MG tablet     ketoconazole (NIZORAL) 2 % external shampoo     No current facility-administered medications for this visit.      Past Medical History:   Patient Active Problem List   Diagnosis     Lipoma of back     Past Medical History:   Diagnosis Date     Recurrent L ankle sprains         CC Referred Self, MD  No address on file on close of this encounter.

## 2022-10-03 NOTE — LETTER
10/3/2022       RE: Edwardo Hope  [address]  [place] [facility] [address]     Dear Colleague,    Thank you for referring your patient, Edwardo Hope, to the [facility] [place] [facility]. Please see a copy of my visit note below.    [facility] Dermatology Note  Encounter Date: Oct 3, 2022  Office Visit     Dermatology Problem List:  1. Seborrheic dermatitis  - Current tx: ketoconazole shampoo, lidex solution  - fluocinolone oil too expensive  ____________________________________________    Assessment & Plan:    # Seborrheic dermatitis,  Well controlled with ketoconazole shampoo and lidex solution. Refilled today.  - Continue ketoconazole shampoo daily  - Continue lidex solution daily prn    Procedures Performed:   None    Follow-up: 1 year(s) in-person, or earlier for new or changing lesions    Staff and Scribe:     Scribe Disclosure:  I, Dewey Sarah, am serving as a scribe to document services personally performed by Marjorie Rachel PA-C based on data collection and the provider's statements to me.     Provider Disclosure:   The documentation recorded by the scribe accurately reflects the services I personally performed and the decisions made by me.    All risks, benefits and alternatives were discussed with patient.  Patient is in agreement and understands the assessment and plan.  All questions were answered.  Sun Screen Education was given.   Return to Clinic annually or sooner as needed.   Marjorie Rachel PA-C   [facility]   ____________________________________________    CC: Derm Problem (Dermatitis follow up.  Medications are effective.  Doing the same since last visit.  )    HPI:  Mr. Edwardo Hope is a(n) 32 year old male who presents today as a return patient for dermatitis follow-up. Last seen in dermatology by Dr. East 5/11/2021, at which time patient was  started on lidex solution daily prn for treatment of seborrheic dermatitis.    Today, patient reports that he gets seborrheic dermatitis primarily on the scalp and also behind ears and beard. He has been using ketoconazole shampoo and lidex solution.    Patient is otherwise feeling well, without additional skin concerns.    Labs Reviewed:  N/A    Physical Exam:  Vitals: There were no vitals taken for this visit.  SKIN: Focused examination of scalp and face was performed.  - Mild erythema and flaking on the right parietal scalp.  - No significant flaking in bearded area.  - No other lesions of concern on areas examined.     Medications:  Current Outpatient Medications   Medication     acetaminophen (TYLENOL) 325 MG tablet     fluocinolone acetonide (DERMA SMOOTHE/FS BODY) 0.01 % external oil     fluocinonide (LIDEX) 0.05 % external solution     ibuprofen (ADVIL/MOTRIN) 400 MG tablet     ketoconazole (NIZORAL) 2 % external shampoo     No current facility-administered medications for this visit.      Past Medical History:   Patient Active Problem List   Diagnosis     Lipoma of back     Past Medical History:   Diagnosis Date     Recurrent L ankle sprains         CC Referred Self, MD  No address on file on close of this encounter.

## 2022-10-06 ENCOUNTER — THERAPY VISIT (OUTPATIENT)
Dept: PHYSICAL THERAPY | Facility: CLINIC | Age: 32
End: 2022-10-06
Payer: COMMERCIAL

## 2022-10-06 DIAGNOSIS — M25.521 RIGHT ELBOW PAIN: Primary | ICD-10-CM

## 2022-10-06 PROCEDURE — 97110 THERAPEUTIC EXERCISES: CPT | Mod: GP | Performed by: PHYSICAL THERAPIST

## 2022-10-06 PROCEDURE — 97161 PT EVAL LOW COMPLEX 20 MIN: CPT | Mod: GP | Performed by: PHYSICAL THERAPIST

## 2022-10-06 NOTE — PROGRESS NOTES
Physical Therapy Initial Evaluation  Subjective:  The history is provided by the patient. No  was used.   Therapist Generated HPI Evaluation  Problem details: DOI: September 2021  Medical history: right knee arthscopy, hernia  Pain level: 5/10 .         Type of problem:  Right elbow.    This is a chronic condition.  Condition occurred with:  Insidious onset.  Where condition occurred: for unknown reasons.  Patient reports pain:  Lateral.  Pain is described as aching, sharp and shooting and is constant.  Radiates to: lateral forearm. Pain timing: none.  Since onset symptoms are gradually improving.  Associated symptoms:  Loss of strength. Symptoms are exacerbated by activity (tennis, prolonged holding or lifting, )  and relieved by ice (stretching, rest).  Imaging testing: none.  Past treatment: none. Improved with treatment: na.  Work activity restrictions: - PhD candidate.  Barriers include:  None as reported by patient.                        Objective:        Flexibility/Screens:   Positive screens:  Cervical                                  ROM:  AROM:      Flexion Elbow:  Left:wnl   Right:wnl  Extension Elbow:  Left: wnl    Right: wnl  Flexion Wrist:  Left: wnl    Right: wnl  Extension Wrist:  Left: wnl    Right:  WNL  Supination Elbow/Wrist:  Left: wnlRight: wnl  Pronation Elbow/Wrist:  Left: 75    Right: 70  Radial Deviation:  Left: wnl   Right: wnl  Ulnar Deviation:  Right: wnl    Left: wnl        Strength:    Flexion Elbow:  Left: 5/5 Pain:    Right: 5/5 Pain:              :     Left: not painful with hand squeeze        Ligament Testing:not assessed        Special Testing:  not assessed      Palpation:  Palpation elbow/wrist: anterior lateral epicondyle.      Mobility:    Proximal Radioulnar:  Left: hypomobile   Right:  Hypomobile  Distal Radioulnar:  Left:  Hypomobile  Right:  Hypomobile                                      General     ROS    Assessment/Plan:     Patient is a 32 year old male with right side elbow complaints.    Patient has the following significant findings with corresponding treatment plan.                Diagnosis 1:  Right elbow pain   Pain -  hot/cold therapy, manual therapy, self management, education, directional preference exercise and home program  Decreased ROM/flexibility - manual therapy, therapeutic exercise, therapeutic activity and home program  Decreased joint mobility - manual therapy, therapeutic exercise, therapeutic activity and home program  Decreased strength - therapeutic exercise, therapeutic activities and home program  Impaired muscle performance - neuro re-education and home program  Decreased function - therapeutic activities and home program    Therapy Evaluation Codes:     1) Clinical presentation characteristics are:   Stable/Uncomplicated.  2) Decision-Making    Low complexity using standardized patient assessment instrument and/or measureable assessment of functional outcome.  Cumulative Therapy Evaluation is: Low complexity.    Previous and current functional limitations:  (See Goal Flow Sheet for this information)    Short term and Long term goals: (See Goal Flow Sheet for this information)     Communication ability:  Patient appears to be able to clearly communicate and understand verbal and written communication and follow directions correctly.  Treatment Explanation - The following has been discussed with the patient:   RX ordered/plan of care  Anticipated outcomes  Possible risks and side effects  This patient would benefit from PT intervention to resume normal activities.   Rehab potential is excellent.    Frequency:  1 X week, once daily  Duration:  for 6 weeks  Discharge Plan:  Achieve all LTG.  Independent in home treatment program.  Reach maximal therapeutic benefit.    Please refer to the daily flowsheet for treatment today, total treatment time and time spent performing 1:1 timed codes.

## 2022-10-12 ENCOUNTER — THERAPY VISIT (OUTPATIENT)
Dept: PHYSICAL THERAPY | Facility: CLINIC | Age: 32
End: 2022-10-12
Payer: COMMERCIAL

## 2022-10-12 DIAGNOSIS — M77.11 LATERAL EPICONDYLITIS OF RIGHT ELBOW: Primary | ICD-10-CM

## 2022-10-12 PROCEDURE — 97530 THERAPEUTIC ACTIVITIES: CPT | Mod: GP | Performed by: PHYSICAL THERAPIST

## 2022-10-12 PROCEDURE — 97110 THERAPEUTIC EXERCISES: CPT | Mod: GP | Performed by: PHYSICAL THERAPIST

## 2022-10-12 PROCEDURE — 97140 MANUAL THERAPY 1/> REGIONS: CPT | Mod: GP | Performed by: PHYSICAL THERAPIST

## 2022-10-12 NOTE — PROGRESS NOTES
Diagnosis: right elbow pain   Precautions/Restrictions/MD instructions/Other pertinent hx None, tennis injury back in September 2021, hx of shoulder issues on and off in past.      Therapist Impression:   Progressed to isotonics strengthening, trial IASTM to wrist extensors.  Focus on correcting downward scapular rotation on R as shoulder may be contributing to elbow issues and pt has history of shoulder issues    GOALS: Playing tennis 2-3 x week    NEXT: IASTM, progression of shoulder exercises    PTRX: online    Subjective:  Having to do a lot of self cares in order to play.  Things are getting better.         Objective:  STATIC POSTURE  Forward head: mild   Rounded shoulders:moderate  Shoulder internally rotated: mild   Visual inspection: Downward rotation R    DYNAMIC SCAPULAR TESTS  Dynamic Scapular Assessment: No obvious findings    SHOULDER RANGE OF MOTION  AROM/PROM no significant findings on R, limited 90-90 ER L    SHOULDER STRENGTH  MMT Right Left   Flexion 5-/5 5-/5   Abduction 5-/5 5-/5   ER 5-/5 5-/5   IR 5/5 5/5   P= pain

## 2022-10-18 ENCOUNTER — THERAPY VISIT (OUTPATIENT)
Dept: PHYSICAL THERAPY | Facility: CLINIC | Age: 32
End: 2022-10-18
Payer: COMMERCIAL

## 2022-10-18 DIAGNOSIS — M77.11 LATERAL EPICONDYLITIS OF RIGHT ELBOW: Primary | ICD-10-CM

## 2022-10-18 PROCEDURE — 97140 MANUAL THERAPY 1/> REGIONS: CPT | Mod: GP | Performed by: PHYSICAL THERAPIST

## 2022-10-18 PROCEDURE — 97110 THERAPEUTIC EXERCISES: CPT | Mod: GP | Performed by: PHYSICAL THERAPIST

## 2022-10-25 ENCOUNTER — THERAPY VISIT (OUTPATIENT)
Dept: PHYSICAL THERAPY | Facility: CLINIC | Age: 32
End: 2022-10-25
Payer: COMMERCIAL

## 2022-10-25 DIAGNOSIS — M77.11 LATERAL EPICONDYLITIS OF RIGHT ELBOW: Primary | ICD-10-CM

## 2022-10-25 PROCEDURE — 97140 MANUAL THERAPY 1/> REGIONS: CPT | Mod: GP | Performed by: PHYSICAL THERAPIST

## 2022-10-25 PROCEDURE — 97110 THERAPEUTIC EXERCISES: CPT | Mod: GP | Performed by: PHYSICAL THERAPIST

## 2022-10-27 ENCOUNTER — MYC MEDICAL ADVICE (OUTPATIENT)
Dept: FAMILY MEDICINE | Facility: CLINIC | Age: 32
End: 2022-10-27

## 2022-11-01 ENCOUNTER — THERAPY VISIT (OUTPATIENT)
Dept: PHYSICAL THERAPY | Facility: CLINIC | Age: 32
End: 2022-11-01
Payer: COMMERCIAL

## 2022-11-01 DIAGNOSIS — M77.11 LATERAL EPICONDYLITIS OF RIGHT ELBOW: Primary | ICD-10-CM

## 2022-11-01 PROCEDURE — 97140 MANUAL THERAPY 1/> REGIONS: CPT | Mod: GP | Performed by: PHYSICAL THERAPIST

## 2022-11-01 PROCEDURE — 97110 THERAPEUTIC EXERCISES: CPT | Mod: GP | Performed by: PHYSICAL THERAPIST

## 2022-11-08 ENCOUNTER — THERAPY VISIT (OUTPATIENT)
Dept: PHYSICAL THERAPY | Facility: CLINIC | Age: 32
End: 2022-11-08
Payer: COMMERCIAL

## 2022-11-08 DIAGNOSIS — M77.11 LATERAL EPICONDYLITIS OF RIGHT ELBOW: Primary | ICD-10-CM

## 2022-11-08 PROCEDURE — 97110 THERAPEUTIC EXERCISES: CPT | Mod: GP | Performed by: PHYSICAL THERAPIST

## 2022-11-08 PROCEDURE — 97140 MANUAL THERAPY 1/> REGIONS: CPT | Mod: GP | Performed by: PHYSICAL THERAPIST

## 2022-11-15 ENCOUNTER — MYC MEDICAL ADVICE (OUTPATIENT)
Dept: DERMATOLOGY | Facility: CLINIC | Age: 32
End: 2022-11-15

## 2022-11-15 DIAGNOSIS — L21.9 SEBORRHEIC DERMATITIS: ICD-10-CM

## 2022-11-16 RX ORDER — KETOCONAZOLE 20 MG/ML
SHAMPOO TOPICAL
Qty: 120 ML | Refills: 11 | Status: SHIPPED | OUTPATIENT
Start: 2022-11-16 | End: 2023-10-11

## 2022-11-22 ENCOUNTER — THERAPY VISIT (OUTPATIENT)
Dept: PHYSICAL THERAPY | Facility: CLINIC | Age: 32
End: 2022-11-22
Payer: COMMERCIAL

## 2022-11-22 DIAGNOSIS — M77.11 LATERAL EPICONDYLITIS OF RIGHT ELBOW: Primary | ICD-10-CM

## 2022-11-22 PROCEDURE — 97140 MANUAL THERAPY 1/> REGIONS: CPT | Mod: GP | Performed by: PHYSICAL THERAPIST

## 2022-12-06 ENCOUNTER — THERAPY VISIT (OUTPATIENT)
Dept: PHYSICAL THERAPY | Facility: CLINIC | Age: 32
End: 2022-12-06
Payer: COMMERCIAL

## 2022-12-06 DIAGNOSIS — M77.11 LATERAL EPICONDYLITIS OF RIGHT ELBOW: Primary | ICD-10-CM

## 2022-12-06 PROCEDURE — 97140 MANUAL THERAPY 1/> REGIONS: CPT | Mod: GP | Performed by: PHYSICAL THERAPIST

## 2022-12-06 NOTE — PROGRESS NOTES
Subjective:  HPI  Physical Exam                    Objective:  System    Physical Exam    General     ROS    Assessment/Plan:    DISCHARGE REPORT    Progress reporting period is from 10-6-2022 to 12-6-2022.       SUBJECTIVE  Subjective changes noted by patient:   Moderate improvement overall. He is able to lift 10 pounds with minimal pain. Michael feels  Comfortable continuing with home program.  Current pain level is  2/10    Previous pain level was  Changes in function:  Yes (See Goal flowsheet attached for changes in current functional level)  Adverse reaction to treatment or activity: None    OBJECTIVE  Changes noted in objective findings:  Yes, ROM:  AROM:       Flexion Elbow:  Left:wnl   Right:wnl  Extension Elbow:  Left: wnl    Right: wnl  Flexion Wrist:  Left: wnl    Right: wnl  Extension Wrist:  Left: wnl    Right:  WNL  Supination Elbow/Wrist:  Left: wnlRight: wnl  Pronation Elbow/Wrist:  Left: 75    Right: 90  Radial Deviation:  Left: wnl   Right: wnl  Ulnar Deviation:  Right: wnl    Left: wnl           Strength:    Flexion Elbow:  Left: 5/5 Pain:    Right: 5/5  Wrist extensors: 5/5 (B0  Radial deviation: 5/5 (B)  Ulnar deviation: 5/5  Wrist flexors: 5/5  Supination: 5/5 (B)  Pronation: 5/5 (B)      :      Left: 100, R: 105          Palpation:  Palpation elbow/wrist: mild tenderness anterior lateral epicondyle.        Mobility:    Proximal Radioulnar:  Left: hypomobile   Right: normal Distal Radioulnar:  Left:  Hypomobile  Right:  normal             Assessment/Plan:    Patient is a 32 year old male with right side elbow complaints.    Patient has the following significant findings with corresponding treatment plan.                Diagnosis 1:  Right elbow pain   Pain -  hot/cold therapy, manual therapy, self management, education, directional preference exercise and home program  Decreased ROM/flexibility - manual therapy, therapeutic exercise, therapeutic activity and home program  Decreased joint  mobility - manual therapy, therapeutic exercise, therapeutic activity and home program  Decreased strength - therapeutic exercise, therapeutic activities and home program  Impaired muscle performance - neuro re-education and home program  Decreased function - therapeutic activities and home program        ASSESSMENT/PLAN  Updated problem list and treatment plan: Diagnosis 1:  Right elbow pain   Pain -  hot/cold therapy, manual therapy, self management, education, directional preference exercise and home program  Decreased ROM/flexibility - manual therapy, therapeutic exercise, therapeutic activity and home program  Decreased joint mobility - manual therapy, therapeutic exercise, therapeutic activity and home program  Decreased strength - therapeutic exercise, therapeutic activities and home program  Impaired muscle performance - neuro re-education and home program  Decreased function - therapeutic activities and home program    STG/LTGs have been met or progress has been made towards goals:  Yes (See Goal flow sheet completed today.)  Assessment of Progress: The patient's condition is improving.  Self Management Plans:  Patient is independent in a home treatment program.  Patient is independent in self management of symptoms.  I have re-evaluated this patient and find that the nature, scope, duration and intensity of the therapy is appropriate for the medical condition of the patient.  Edwardo continues to require the following intervention to meet STG and LTG's:  PT intervention is no longer required to meet STG/LTG.    Recommendations:  This patient is ready to be discharged from therapy and continue their home treatment program.    Please refer to the daily flowsheet for treatment today, total treatment time and time spent performing 1:1 timed codes.

## 2023-02-08 DIAGNOSIS — Z13.71 TESTING OF MALE FOR GENETIC DISEASE CARRIER STATUS: Primary | ICD-10-CM

## 2023-02-28 ENCOUNTER — VIRTUAL VISIT (OUTPATIENT)
Dept: MATERNAL FETAL MEDICINE | Facility: CLINIC | Age: 33
End: 2023-02-28
Attending: OBSTETRICS & GYNECOLOGY
Payer: COMMERCIAL

## 2023-02-28 DIAGNOSIS — Z31.5 ENCOUNTER FOR PROCREATIVE GENETIC COUNSELING AND TESTING: ICD-10-CM

## 2023-02-28 DIAGNOSIS — Z31.440 ENCOUNTER OF MALE FOR TESTING FOR GENETIC DISEASE CARRIER STATUS FOR PROCREATIVE MANAGEMENT: Primary | ICD-10-CM

## 2023-02-28 DIAGNOSIS — Z13.71 TESTING OF MALE FOR GENETIC DISEASE CARRIER STATUS: ICD-10-CM

## 2023-02-28 PROCEDURE — 999N000069 HC STATISTIC GENETIC COUNSELING, < 16 MIN: Mod: GT,95 | Performed by: GENETIC COUNSELOR, MS

## 2023-02-28 NOTE — PROGRESS NOTES
[facility] Fetal Medicine [place]  Genetic Counseling Consult    Patient:  Edwardo Hope YOB: 1990   Date of Service:  2/28/23   MRN: [id number]      Video-Visit Details    Type of service:  Video Visit    Video Start Time (time video started): 8:05    Video End Time (time video stopped): 8:20    Originating Location (pt. Location): Home    Distant Location (provider location):  On-site    Mode of Communication:  Video Conference via Lyle Rodriguez was seen at the [facility] for virtual genetic consultation. The indication for genetic counseling is consent for carrier screening due to partner carrier status. The patient was unaccompanied to this visit.       The session was conducted in English.      IMPRESSION/ PLAN   1. Edwardo's partner Marilyn is an [facility] patient who was found to be a carrier of multiple recessive genetic disorders, and Edwardo met with genetic counseling today to coordinate testing for himself.    2. During today's [facility] visit, Edwardo completed a consent for carrier screening and will have a saliva collection kit sent directly to his home address by [employer] Results are expected in 2-3 weeks. The patient will be called with results and if they do not answer they requested a detailed message with results on their voicemail.Patient was informed that results will be available in [employer].       MEDICAL HISTORY   Edwardo s reported medical history is not expected to impact pregnancy management or risks to fetal development.       CARRIER SCREENING   Expanded carrier screening is available to screen for autosomal recessive conditions and X-linked conditions in a large list of genes. Autosomal recessive conditions happen when a mutation has been inherited from the egg and sperm and include conditions like cystic fibrosis, thalassemia, hearing loss, spinal muscular atrophy, and more. X-linked conditions happen when a mutation has  been inherited from the egg and include conditions like fragile X syndrome.  screening was also reviewed. About [place]  Screening        The patient elected to pursue carrier screening today. We discussed the following:     Carrier screening does not test the pregnancy but gives a risk assessment for the pregnancy and future pregnancies to have the condition    There are different size panels or list of conditions for carrier screening. The patient elected for [employer]'s comprehensive panel of 558 genes    Some conditions cause health problems for carriers    Carrier screening does not test for all genetic and health conditions or risk factors    There are limitations to current technology and results may be updated at a later date    The results typically take 2-3 weeks. They will be available in EPIC and routed to the referring OB provider. The patient can view them in [employer] and the lab's patient portal.    The patient's partner has already had carrier screening and is a carrier for biotinidase deficiency, hydrolethalus syndrome, SCID CD3-epsilon deficiency, and NPHS2 related nephrotic syndrome    If an individual is a carrier, family members could be as well. The patient is encouraged to share this information with relatives.    The lab will communicate the out-of-pocket cost with the patient and will also provide an option to switch to self-pay. The default is billing through insurance, and it is the patient's responsibility to respond to the communication if they would like to switch to self-pay.      We discussed carrier screening can be done before or during apregnancy. The purpose of carrier screening is providing an individual or couple with a personalized risk assessment for genetic conditions. This is accomplished by screening an individual to determine if they are a carrierfor a genetic condition. For most conditions, both parents must be a carrier of the condition for the pregnancy to be at  an increased risk. For other conditions that are X-linked, there is an increased risk when a female(mother) is a carrier and the concerns are greater if she passes that condition to a son.     Carrier screening is an option and a personal decision to pursue. If an individual or couple is interested or wishes to pursue carrier screening the following is discussed:    For most genetic conditions, carriers are healthy individuals. For autosomal recessive conditions (ex cystic fibrosis, sicklecell anemia, etc), individuals have two copies of each gene. Carrier individuals have a mutation in one copy. For X-linked conditions, females have two copies of each gene and are considered carriers if one copy has amutation. Males only have one copy of an X-linked gene, therefore, if that one copy has a mutation they are affected by the condition, rather than only being a carrier      For select conditions, carriers can have symptoms, however, the chance is variable. Examples of these conditions include:  o Femalepremutation carriers of fragile X syndrome can have symptoms in adulthood including premature ovarian failure and ataxia. If a female passes the mutation to a son they are at a high risk for fragile X syndrome, which is themost common genetic cause for autism spectrum disorder  o Female or male carriers of Gaucher disease can have an increased chance for developing Parkinson's disease. Gaucher disease is a severe metabolic disorder.       If both parents are carriers of an autosomal recessive condition, there are three possible outcomes for each pregnancy/child: 25% unaffected, 50% carrier, and 25%affected. The only method to determine the outcome or diagnosis the condition in a pregnancy is an invasive testing option such as an amniocentesis. Some genetic conditions will have ultrasound findings during the pregnancybut many do not. Some couples will choose the diagnostic testing to plan for delivery or choose  termination of an affected pregnancy. Other couples will choose to test for the condition after delivery. While these conditionscannot be cured or treated during the pregnancy it may guide management recommendations (ultrasounds) for pregnancy as well as delivery and infant care.         Carrier screening is not meant to diagnose the patient with a condition, and generally carriers are asymptomatic. However, certain genes may confer increased risks for various health concerns in carriers such as fragile X syndrome, Duchene muscular dystrophy, and Gaucher disease among others.       We discussed that expanded carrier screening is designed to identify carrier status for conditions that are primarily childhood or adolescent onset. Expanded carrier screening does not evaluate for adult-onset conditions such as hereditary cancer syndromes, dementia/ Alzheimer's disease, or cardiovascular disease risk factors. Additionally, expanded carrier screening is not comprehensive for all known genetic diseases or inherited conditions. This is a screening test, and residual carrier status risk figures will be provided to the patient after results become available.  We discussed there are limitations such as current technology and rare chance of a false positive or negative.       [facility] laboratory will report on pseudodeficiency alleles, which are benign variants that are not known to be associated with disease and are not thought to impact the individuals risk to be a carrier for these conditions. However, the presence of pseudodeficiency alleles can exhibit false positive results on biochemical. Therefore, disclosing this information to patients may aid in interpretation of a  screen flag.       [employer] will contact the patient via text, email, or both with cost estimate information. The communication will list the cost billed through insurance and provide an option for self-pay. The default is insurance billing so  patients must choose the self pay option and follow through with credit card information if desired. The self pay cost of NIPT is $99, carrier screening is $250 with partner carrier screening for $100. The patient has the responsibility to determine if insurance or self pay is a better financial decision.        It was a pleasure to be involved with Edwardo s care. Face-to-face time of the meeting was 15 minutes.    Chris Frias, [place], MS, [facility]  Certified and [place] Licensed Genetic Counselor  [facility]  Maternal Fetal Medicine  Office: [phone]  [facility]: [phone]   Fax: [phone]  [facility]

## 2023-03-13 DIAGNOSIS — Z31.440 ENCOUNTER OF MALE FOR TESTING FOR GENETIC DISEASE CARRIER STATUS FOR PROCREATIVE MANAGEMENT: ICD-10-CM

## 2023-03-13 DIAGNOSIS — Z31.5 ENCOUNTER FOR PROCREATIVE GENETIC COUNSELING AND TESTING: ICD-10-CM

## 2023-03-25 ENCOUNTER — PATIENT OUTREACH (OUTPATIENT)
Dept: DERMATOLOGY | Facility: CLINIC | Age: 33
End: 2023-03-25
Payer: COMMERCIAL

## 2023-03-25 NOTE — TELEPHONE ENCOUNTER
Attempted to reach patient to schedule follow up in the Dermatology Clinic.  No answer,  LM on VM to call office and [employer] message sent..    Schedule with Marjorie Rachel PA-C 10/2023.

## 2023-04-01 ENCOUNTER — PATIENT OUTREACH (OUTPATIENT)
Dept: DERMATOLOGY | Facility: CLINIC | Age: 33
End: 2023-04-01
Payer: COMMERCIAL

## 2023-04-01 NOTE — TELEPHONE ENCOUNTER
Attempted to reach patient to schedule follow up in the Dermatology Clinic.  No answer,  LM on VM to call office and Letter mailed..    Schedule with Marjorie Rachel PA-C 10/2023.

## 2023-04-01 NOTE — LETTER
April 1, 2023          Edwardo Hope  [address] [address]        Dear Edwardo Hope:    We recently reviewed your medical records from [facility] and found that you are due for an appointment with Marjorie Rachel PA-C in October.  Our office has attempted to reach you via telephone and left a message.    At your earliest convenience, please call the clinic at [phone] to arrange the recommended exam and possible testing.  Please kindly mention this letter when calling so that your appointment(s) can be accurately scheduled.      Sincerely,       The Clinic Staff        Medical Record Number:  [id number]

## 2023-04-18 ENCOUNTER — TELEPHONE (OUTPATIENT)
Dept: MATERNAL FETAL MEDICINE | Facility: CLINIC | Age: 33
End: 2023-04-18
Payer: COMMERCIAL

## 2023-04-18 NOTE — TELEPHONE ENCOUNTER
4/18/2023    Called Edwardo to discuss results of his expanded carrier screening.  Edwardo was identified as a carrier for 2 separate conditions, described below.  Edwardo's partner Marilyn was negative for both of these conditions, and reproductive risks are considered low for all 558 conditions assessed.  Siblings for Edwardo are at 50% risk for being carriers and we discussed that communication about this risk may be beneficial for them for family planning.     Cystic Fibrosis and CFTR Related Disorders ([id number]):    Cystic fibrosis is one of the most common recessive genetic disorders in [place] and impacts respiratory, digestive, and reproductive systems.  There is some evidence that being a carrier for CF may slightly increase the lifetime risks for a carrier to develop pancreatitis, however the risks for this remains under 1% and there is no recommended adjustment to health management.  Marilyn tested negative for CFTR related disorders, and reproductive risks are low for the couple.      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 Symptoms of these conditions include progressive vision loss, complex spectrum of birth defects throughout the body, and intellectual disability.  Being a carrier is not expected to impact Edwardo's health, and Marilyn's negative results for this condition significantly reduce the risks for an affected pregnancy.    Edwardo also reports that he has not received a bill for the testing but would like to confirm his enrollment in self pay for testing.  A message will be sent to an "[employer]  to help address.      Chris Frias MS, [facility]  Licensed Genetic Counselor  Phone: [phone]  Pager: [phone]

## 2023-04-28 LAB — SCANNED LAB RESULT: NORMAL

## 2023-05-07 ENCOUNTER — HEALTH MAINTENANCE LETTER (OUTPATIENT)
Age: 33
End: 2023-05-07

## 2023-10-11 ENCOUNTER — OFFICE VISIT (OUTPATIENT)
Dept: DERMATOLOGY | Facility: CLINIC | Age: 33
End: 2023-10-11
Payer: COMMERCIAL

## 2023-10-11 DIAGNOSIS — Z12.83 SKIN CANCER SCREENING: ICD-10-CM

## 2023-10-11 DIAGNOSIS — D22.9 MULTIPLE PIGMENTED NEVI: ICD-10-CM

## 2023-10-11 DIAGNOSIS — L21.9 SEBORRHEIC DERMATITIS: Primary | ICD-10-CM

## 2023-10-11 PROCEDURE — 99213 OFFICE O/P EST LOW 20 MIN: CPT | Performed by: PHYSICIAN ASSISTANT

## 2023-10-11 RX ORDER — KETOCONAZOLE 20 MG/ML
SHAMPOO TOPICAL
Qty: 120 ML | Refills: 11 | Status: SHIPPED | OUTPATIENT
Start: 2023-10-11

## 2023-10-11 RX ORDER — FLUOCINONIDE TOPICAL SOLUTION USP, 0.05% 0.5 MG/ML
SOLUTION TOPICAL 2 TIMES DAILY
Qty: 60 ML | Refills: 3 | Status: SHIPPED | OUTPATIENT
Start: 2023-10-11

## 2023-10-11 ASSESSMENT — PAIN SCALES - GENERAL: PAINLEVEL: NO PAIN (0)

## 2023-10-11 NOTE — PROGRESS NOTES
[facility] Dermatology Note  Encounter Date: Oct 11, 2023  Office Visit     Dermatology Problem List:  1. Seborrheic dermatitis  - Current tx: ketoconazole shampoo, lidex solution  - fluocinolone oil too expensive    Last FBSC: 10/11/23  ____________________________________________    Assessment & Plan:     # Seborrheic dermatitis, chronic, well controlled.   Well controlled with ketoconazole shampoo and lidex solution. Refilled today.  - Continue ketoconazole shampoo daily  - Continue lidex solution daily prn    # Skin cancer screening with multiple benign nevi.    - ABCDEs: Counseled ABCDEs of melanoma: Asymmetry, Border (irregularity), Color (not uniform, changes in color), Diameter (greater than 6 mm which is about the size of a pencil eraser), and Evolving (any changes in preexisting moles).  - Sun protection: Counseled SPF30+ sunscreen, UPF clothing, sun avoidance, tanning bed avoidance.    Procedures Performed:   None    Follow-up: 1 year(s) in-person, or earlier for new or changing lesions    Staff and Scribe:      I, Grciel Corona, am serving as a scribe to document services personally performed by Marjorie Rachel PA-C based on data collection and the provider's statements to me.    Provider Disclosure:   The documentation recorded by the scribe accurately reflects the services I personally performed and the decisions made by me.    All risks, benefits and alternatives were discussed with patient.  Patient is in agreement and understands the assessment and plan.  All questions were answered.  Sun Screen Education was given.   Return to Clinic annually or sooner as needed.   Marjorie Rachel PA-C   [facility]    ____________________________________________    CC: Skin Check (FBSE.  Dermatitis of the scalp, dry and flaky.  Uses shampoo and oil. )    HPI:  Mr. Edwardo Hope is a(n) 33 year old male who presents today as a return patient for FBSC and  seborrheic dermatitis follow up.  Patient reports that his scalp is controlled with the shampoos and solution.  He is using the solution as needed about 1-2 time weekly and is using the shampoo whenever he washes his hair.  Father has had skin problems in the past, but is otherwise doing well. He denies any lesions on his skin that are painful, bleeding, or changing in shape, size, or color.  He reports that he has had multiples cysts in the past.  He is wearing sunscreen daily.     Patient is otherwise feeling well, without additional skin concerns.     Labs Reviewed:  N/A    Physical Exam:  Vitals: There were no vitals taken for this visit.  SKIN: Full skin, which includes the head/face, both arms, chest, back, abdomen,both legs, genitalia and/or groin buttocks, digits and/or nails, was examined.  - Multiple regular brown pigmented macules and papules are identified on the back, <100.   - No significant scaling or erythema to the beard or scalp.   - No other lesions of concern on areas examined.     Medications:  Current Outpatient Medications   Medication    fluocinolone acetonide (DERMA SMOOTHE/FS BODY) 0.01 % external oil    fluocinonide (LIDEX) 0.05 % external solution    ketoconazole (NIZORAL) 2 % external shampoo     No current facility-administered medications for this visit.      Past Medical History:   Patient Active Problem List   Diagnosis    Lipoma of back    Lateral epicondylitis of right elbow     Past Medical History:   Diagnosis Date    Recurrent L ankle sprains        CC Referred Self, MD  No address on file on close of this encounter.

## 2023-10-11 NOTE — LETTER
10/11/2023       RE: Edwardo Hope  [address]  [place] [address]     Dear Colleague,    Thank you for referring your patient, Edwardo Hope, to the [facility] DERMATOLOG[facility] [place] [facility]. Please see a copy of my visit note below.    [facility] Dermatology Note  Encounter Date: Oct 11, 2023  Office Visit     Dermatology Problem List:  1. Seborrheic dermatitis  - Current tx: ketoconazole shampoo, lidex solution  - fluocinolone oil too expensive    Last FBSC: 10/11/23  ____________________________________________    Assessment & Plan:     # Seborrheic dermatitis, chronic, well controlled.   Well controlled with ketoconazole shampoo and lidex solution. Refilled today.  - Continue ketoconazole shampoo daily  - Continue lidex solution daily prn    # Skin cancer screening with multiple benign nevi.    - ABCDEs: Counseled ABCDEs of melanoma: Asymmetry, Border (irregularity), Color (not uniform, changes in color), Diameter (greater than 6 mm which is about the size of a pencil eraser), and Evolving (any changes in preexisting moles).  - Sun protection: Counseled SPF30+ sunscreen, UPF clothing, sun avoidance, tanning bed avoidance.    Procedures Performed:   None    Follow-up: 1 year(s) in-person, or earlier for new or changing lesions    Staff and Scribe:      I, Gricel Corona, am serving as a scribe to document services personally performed by Marjorie Rachel PA-C based on data collection and the provider's statements to me.    Provider Disclosure:   The documentation recorded by the scribe accurately reflects the services I personally performed and the decisions made by me.    All risks, benefits and alternatives were discussed with patient.  Patient is in agreement and understands the assessment and plan.  All questions were answered.  Sun Screen Education was given.   Return to Clinic annually or sooner as needed.    Marjorie Rachel PA-C   [facility]    ____________________________________________    CC: Skin Check (FBSE.  Dermatitis of the scalp, dry and flaky.  Uses shampoo and oil. )    HPI:  Mr. Edwardo Hpoe is a(n) 33 year old male who presents today as a return patient for FBSC and seborrheic dermatitis follow up.  Patient reports that his scalp is controlled with the shampoos and solution.  He is using the solution as needed about 1-2 time weekly and is using the shampoo whenever he washes his hair.  Father has had skin problems in the past, but is otherwise doing well. He denies any lesions on his skin that are painful, bleeding, or changing in shape, size, or color.  He reports that he has had multiples cysts in the past.  He is wearing sunscreen daily.     Patient is otherwise feeling well, without additional skin concerns.     Labs Reviewed:  N/A    Physical Exam:  Vitals: There were no vitals taken for this visit.  SKIN: Full skin, which includes the head/face, both arms, chest, back, abdomen,both legs, genitalia and/or groin buttocks, digits and/or nails, was examined.  - Multiple regular brown pigmented macules and papules are identified on the back, <100.   - No significant scaling or erythema to the beard or scalp.   - No other lesions of concern on areas examined.     Medications:  Current Outpatient Medications   Medication    fluocinolone acetonide (DERMA SMOOTHE/FS BODY) 0.01 % external oil    fluocinonide (LIDEX) 0.05 % external solution    ketoconazole (NIZORAL) 2 % external shampoo     No current facility-administered medications for this visit.      Past Medical History:   Patient Active Problem List   Diagnosis    Lipoma of back    Lateral epicondylitis of right elbow     Past Medical History:   Diagnosis Date    Recurrent L ankle sprains        CC Referred Self, MD  No address on file on close of this encounter.

## 2023-10-11 NOTE — PATIENT INSTRUCTIONS

## 2023-10-11 NOTE — NURSING NOTE
Dermatology Rooming Note    Edwardo Hope's goals for this visit include:   Chief Complaint   Patient presents with    Skin Check     FBSE.  Dermatitis of the scalp, dry and flaky.  Uses shampoo and oil.      Cristine Torres, CMA

## 2023-10-17 ENCOUNTER — TELEPHONE (OUTPATIENT)
Dept: DERMATOLOGY | Facility: CLINIC | Age: 33
End: 2023-10-17
Payer: COMMERCIAL

## 2023-10-17 NOTE — TELEPHONE ENCOUNTER
Patient Contacted and schedule the following:    Appointment type: Return  Provider: Marjorie Rachel  Return date: 10/14/24  Specialty phone number: [phone]

## 2023-10-19 ENCOUNTER — OFFICE VISIT (OUTPATIENT)
Dept: FAMILY MEDICINE | Facility: CLINIC | Age: 33
End: 2023-10-19
Payer: COMMERCIAL

## 2023-10-19 ENCOUNTER — LAB (OUTPATIENT)
Dept: LAB | Facility: CLINIC | Age: 33
End: 2023-10-19
Payer: COMMERCIAL

## 2023-10-19 VITALS
DIASTOLIC BLOOD PRESSURE: 73 MMHG | WEIGHT: 137.4 LBS | OXYGEN SATURATION: 100 % | HEART RATE: 58 BPM | BODY MASS INDEX: 22.89 KG/M2 | SYSTOLIC BLOOD PRESSURE: 126 MMHG | HEIGHT: 65 IN

## 2023-10-19 DIAGNOSIS — Z11.59 NEED FOR HEPATITIS C SCREENING TEST: ICD-10-CM

## 2023-10-19 DIAGNOSIS — Z00.00 HEALTH CARE MAINTENANCE: ICD-10-CM

## 2023-10-19 DIAGNOSIS — R23.3 EASY BRUISING: ICD-10-CM

## 2023-10-19 DIAGNOSIS — Z11.4 SCREENING FOR HIV (HUMAN IMMUNODEFICIENCY VIRUS): Primary | ICD-10-CM

## 2023-10-19 DIAGNOSIS — M25.511 RIGHT SHOULDER PAIN, UNSPECIFIED CHRONICITY: ICD-10-CM

## 2023-10-19 DIAGNOSIS — Z11.4 SCREENING FOR HIV (HUMAN IMMUNODEFICIENCY VIRUS): ICD-10-CM

## 2023-10-19 DIAGNOSIS — Z31.41 FERTILITY TESTING: ICD-10-CM

## 2023-10-19 LAB
BASO+EOS+MONOS # BLD AUTO: NORMAL 10*3/UL
BASO+EOS+MONOS NFR BLD AUTO: NORMAL %
BASOPHILS # BLD AUTO: 0.1 10E3/UL (ref 0–0.2)
BASOPHILS NFR BLD AUTO: 1 %
CHOLEST SERPL-MCNC: 191 MG/DL
EOSINOPHIL # BLD AUTO: 0.2 10E3/UL (ref 0–0.7)
EOSINOPHIL NFR BLD AUTO: 3 %
ERYTHROCYTE [DISTWIDTH] IN BLOOD BY AUTOMATED COUNT: 12.2 % (ref 10–15)
HCT VFR BLD AUTO: 41.5 % (ref 40–53)
HDLC SERPL-MCNC: 57 MG/DL
HGB BLD-MCNC: 13.9 G/DL (ref 13.3–17.7)
IMM GRANULOCYTES # BLD: 0 10E3/UL
IMM GRANULOCYTES NFR BLD: 0 %
INR PPP: 1.11 (ref 0.85–1.15)
LDLC SERPL CALC-MCNC: 120 MG/DL
LYMPHOCYTES # BLD AUTO: 2.2 10E3/UL (ref 0.8–5.3)
LYMPHOCYTES NFR BLD AUTO: 34 %
MCH RBC QN AUTO: 29.8 PG (ref 26.5–33)
MCHC RBC AUTO-ENTMCNC: 33.5 G/DL (ref 31.5–36.5)
MCV RBC AUTO: 89 FL (ref 78–100)
MONOCYTES # BLD AUTO: 0.6 10E3/UL (ref 0–1.3)
MONOCYTES NFR BLD AUTO: 9 %
NEUTROPHILS # BLD AUTO: 3.5 10E3/UL (ref 1.6–8.3)
NEUTROPHILS NFR BLD AUTO: 53 %
NONHDLC SERPL-MCNC: 134 MG/DL
NRBC # BLD AUTO: 0 10E3/UL
NRBC BLD AUTO-RTO: 0 /100
PLATELET # BLD AUTO: 202 10E3/UL (ref 150–450)
RBC # BLD AUTO: 4.66 10E6/UL (ref 4.4–5.9)
TRIGL SERPL-MCNC: 69 MG/DL
WBC # BLD AUTO: 6.6 10E3/UL (ref 4–11)

## 2023-10-19 PROCEDURE — 99000 SPECIMEN HANDLING OFFICE-LAB: CPT | Performed by: PATHOLOGY

## 2023-10-19 PROCEDURE — 36415 COLL VENOUS BLD VENIPUNCTURE: CPT | Performed by: PATHOLOGY

## 2023-10-19 PROCEDURE — 87389 HIV-1 AG W/HIV-1&-2 AB AG IA: CPT | Performed by: FAMILY MEDICINE

## 2023-10-19 PROCEDURE — 86803 HEPATITIS C AB TEST: CPT | Performed by: FAMILY MEDICINE

## 2023-10-19 PROCEDURE — 80061 LIPID PANEL: CPT | Performed by: PATHOLOGY

## 2023-10-19 PROCEDURE — 99395 PREV VISIT EST AGE 18-39: CPT | Performed by: FAMILY MEDICINE

## 2023-10-19 PROCEDURE — 85025 COMPLETE CBC W/AUTO DIFF WBC: CPT | Performed by: PATHOLOGY

## 2023-10-19 PROCEDURE — 85610 PROTHROMBIN TIME: CPT | Performed by: PATHOLOGY

## 2023-10-19 NOTE — PROGRESS NOTES
Michael is a 33 year old that presents in clinic today for the following:     Chief Complaint   Patient presents with    Physical     Discuss right shoulder rotator cuff.           10/19/2023     4:21 PM   Additional Questions   Roomed by KTR       Screenings from encounters over the past 10 days    No data recorded       VICTORINA Lombardi at 4:24 PM on 10/19/2023

## 2023-10-20 LAB
HCV AB SERPL QL IA: NONREACTIVE
HIV 1+2 AB+HIV1 P24 AG SERPL QL IA: NONREACTIVE

## 2023-10-20 NOTE — PROGRESS NOTES
Assessment & Plan     Screening for HIV (human immunodeficiency virus)  Routine  - HIV Screening; Future    Need for hepatitis C screening test  Same  - Hepatitis C Screen Reflex to HCV RNA Quant and Genotype; Future    Right shoulder pain, unspecified chronicity    - Orthopedic  Referral; Future  - Physical Therapy Referral; Future    Health care maintenance    - Lipid panel reflex to direct LDL Fasting; Future    Easy bruising  Normal labs  - CBC with platelets and differential; Future  - INR; Future    Likley Raynauds: he'll mychart if wants Rheum visit    Fertility testing  See HPI  - Semen Analysis; Future      35 minutes spent by me on the date of the encounter doing chart review, history and exam, documentation and further activities per the note      No follow-ups on file.    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 He has Raynauds by symptoms, discussed it/meds, could see Rheum to discuss, he'll consider  Notes if uses much motrin bruises easily, not w/ aleve  Overall doing well  Past Medical History:   Diagnosis Date    Recurrent L ankle sprains      Past Surgical History:   Procedure Laterality Date    ARTHROSCOPY KNEE Right 2012    EXCISE MASS BACK Left 5/25/2022    Procedure: EXCISION, MASS, BACK, LEFT;  Surgeon: Thao Dejesus MD;  Location: [facility] OR    HERNIA REPAIR Right 2008    inguinal     Current Outpatient Medications   Medication     fluocinonide (LIDEX) 0.05 % external solution    ketoconazole (NIZORAL) 2 % external shampoo    fluocinolone acetonide (DERMA SMOOTHE/FS BODY) 0.01 % external oil     No current facility-administered medications for this visit.     No Known Allergies  Family History   Problem Relation Age of Onset    Melanoma No family hx of     Skin Cancer No family hx of      Social History     Socioeconomic History    Marital status:      Spouse name: Not on file    Number of children: Not on file    Years of education: Not on file    Highest education level: Not on file   Occupational History    Not on file   Tobacco Use    Smoking status: Never    Smokeless tobacco: Never   Substance and Sexual Activity    Alcohol use: Not on file    Drug use: Not on file    Sexual activity: Not on file   Other Topics Concern    Not on file   Social History Narrative    Not on file     Social Determinants of Health     Financial Resource Strain: Low Risk  (10/12/2023)    Financial Resource Strain     Within the past 12 months, have you or your family members you live with been unable to get utilities (heat, electricity) when it was really needed?: No   Food Insecurity: Low Risk  (10/12/2023)    Food Insecurity     Within the past 12 months, did you worry that your food would run out before you got money to buy more?: No     Within the past 12 months, did the food you bought just not last and you didn t have money to get more?: No   Transportation Needs: Low Risk  (10/12/2023)    Transportation Needs     Within the past 12 months, has lack of transportation kept you from medical appointments, getting your medicines, non-medical meetings or appointments, work, or from getting things that you need?: No   Physical Activity: Not on file   Stress: Not on file   Social Connections: Not on file   Interpersonal Safety: Low Risk  (10/19/2023)    Interpersonal Safety     Do you feel physically and emotionally safe where you currently live?: Yes      "Within the past 12 months, have you been hit, slapped, kicked or otherwise physically hurt by someone?: No     Within the past 12 months, have you been humiliated or emotionally abused in other ways by your partner or ex-partner?: No   Housing Stability: Low Risk  (10/12/2023)    Housing Stability     Do you have housing? : Yes     Are you worried about losing your housing?: No                   Review of Systems         Objective    /73 (BP Location: Right arm, Patient Position: Sitting, Cuff Size: Adult Regular)   Pulse 58   Ht 1.644 m (5' 4.72\")   Wt 62.3 kg (137 lb 6.4 oz)   SpO2 100%   BMI 23.06 kg/m    Body mass index is 23.06 kg/m .  Physical Exam   GENERAL: healthy, alert and no distress  EYES: Eyes grossly normal to inspection, PERRL and conjunctivae and sclerae normal  HENT: ear canals and TM's normal, nose and mouth without ulcers or lesions  NECK: no adenopathy, no asymmetry, masses, or scars and thyroid normal to palpation  RESP: lungs clear to auscultation - no rales, rhonchi or wheezes  CV: regular rate and rhythm, normal S1 S2, no S3 or S4, no murmur, click or rub, no peripheral edema and peripheral pulses strong  ABDOMEN: soft, nontender, no hepatosplenomegaly, no masses and bowel sounds normal   (male): normal male genitalia without lesions or urethral discharge, no hernia  MS: no gross musculoskeletal defects noted, no edema  SKIN: no suspicious lesions or rashes  NEURO: Normal strength and tone, mentation intact and speech normal  PSYCH: mentation appears normal, affect normal/bright                  "

## 2023-10-20 NOTE — TELEPHONE ENCOUNTER
DIAGNOSIS: R shoulder pain/Dr. Whiting/HEIDI/ortho con    APPOINTMENT DATE: 10/26/23   NOTES STATUS DETAILS   OFFICE NOTE from referring provider Internal 10/19/23 - Vinayak Whiting MD   PRO Therapy Media Tab 06/21/23 - Jasper Sheldon PT   MEDICATION LIST Internal

## 2023-10-23 ENCOUNTER — MYC MEDICAL ADVICE (OUTPATIENT)
Dept: FAMILY MEDICINE | Facility: CLINIC | Age: 33
End: 2023-10-23
Payer: COMMERCIAL

## 2023-10-24 DIAGNOSIS — M25.511 RIGHT SHOULDER PAIN: Primary | ICD-10-CM

## 2023-10-26 ENCOUNTER — OFFICE VISIT (OUTPATIENT)
Dept: ORTHOPEDICS | Facility: CLINIC | Age: 33
End: 2023-10-26
Payer: COMMERCIAL

## 2023-10-26 ENCOUNTER — ANCILLARY PROCEDURE (OUTPATIENT)
Dept: GENERAL RADIOLOGY | Facility: CLINIC | Age: 33
End: 2023-10-26
Attending: FAMILY MEDICINE
Payer: COMMERCIAL

## 2023-10-26 ENCOUNTER — PRE VISIT (OUTPATIENT)
Dept: ORTHOPEDICS | Facility: CLINIC | Age: 33
End: 2023-10-26

## 2023-10-26 DIAGNOSIS — M25.511 RIGHT SHOULDER PAIN, UNSPECIFIED CHRONICITY: ICD-10-CM

## 2023-10-26 PROCEDURE — 99214 OFFICE O/P EST MOD 30 MIN: CPT | Performed by: FAMILY MEDICINE

## 2023-10-26 PROCEDURE — 73030 X-RAY EXAM OF SHOULDER: CPT | Mod: RT | Performed by: RADIOLOGY

## 2023-10-26 NOTE — PROGRESS NOTES
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  Has been refractory to physical therapy and rest.    Reviewed imaging and assessment with patient in detail  At this time would recommend further evaluation with MRI of the right shoulder.  After MRI may consider surgical referral versus steroid injection.    Meño Leiva MD  [facility] SPORTS MEDICINE CLINIC [place]    -----  Chief Complaint   Patient presents with    Right Shoulder - Pain       SUBJECTIVE  Edwardo Hope is a/an 33 year old male who is seen in consultation at the request of  Vinayak Whiting M.D. for evaluation of  right shoulder pain.     The patient is seen by themselves.  The patient is Right handed    Onset: Spring 2021. Patient describes injury as being a . He played in college and now has started playing again.   Location of Pain: right anterior shoulder and into biceps  Worsened by: Tennis, above head motion, across body motion   Better with: physical therapy   Treatments tried: physical therapy was improving but now plateau   Associated symptoms: no distal numbness or tingling; denies swelling or warmth    Orthopedic/Surgical history: NO  Social History/Occupation: PhD student at        REVIEW OF SYSTEMS:  Do you have fever, chills, weight loss? No  Do you have any vision problems? No  Do you have any chest pain or edema? No  Do you have any shortness of breath or wheezing?  No  Do you have stomach problems? No  Do you have any numbness or focal weakness? No  Do you have diabetes? No  Do you have problems with bleeding or clotting? No  Do you have an rashes or other skin lesions? No    OBJECTIVE:  There were no vitals taken for this visit.     EXAM:  Alert, pleasant and conversational    Neck with full AROM, non-tender to midline  cervical and upper thoracic spine palpation, negative Spurling's.  Elbow with FROM and non-tender to palpation      right shoulder:   Skin intact. No skin changes, deformity or atrophy    AROM:   Forward Flexion: 180    Abduction: 180    External rotation: ~70    Internal rotation: T7.   symmetric ROM compared to uninjured side  symmetric Scapular motion    Strength testing:   Abduction: 5/5,   External rotation: 5/5   Internal rotation 5/5     Deltoids 5/5, Biceps 5/5, Triceps 5/5,  5/5.    Palpation: negative TTP of the Acromioclavicular joint  negative TTP of Sternoclavicular joint  negative TTP of posterior glenoid  negative TTP of scapular borders  negative TTP of the bicipital tendon.     Special Tests: positive  Logan test  positive  Neer's test.   positive Walker's test   negative Speed's test.  Positive resisted throwers  Positive biceps load    Neurovascularly intact bilateral upper extremities.      RADIOLOGY:    4 view xrays of right shoulder performed and reviewed independently demonstrating no acute fracture or dislocation. No djd. See EMR for formal radiology report.

## 2023-10-26 NOTE — LETTER
10/26/2023      RE: Edwardo Hope  [address]  [place] [facility] [address]     Dear Colleague,    Thank you for referring your patient, Edwardo Hope, to the [facility] SPORTS MEDICINE [facility]. Please see a copy of my visit note below.      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  Has been refractory to physical therapy and rest.    Reviewed imaging and assessment with patient in detail  At this time would recommend further evaluation with MRI of the right shoulder.  After MRI may consider surgical referral versus steroid injection.    Meño Leiva MD  [facility] SPORTS MEDICINE [facility]    -----  Chief Complaint   Patient presents with    Right Shoulder - Pain       SUBJECTIVE  Edwardo Hope is a/an 33 year old male who is seen in consultation at the request of  Vinayak Whiting M.D. for evaluation of  right shoulder pain.     The patient is seen by themselves.  The patient is Right handed    Onset: Spring 2021. Patient describes injury as being a . He played in college and now has started playing again.   Location of Pain: right anterior shoulder and into biceps  Worsened by: Tennis, above head motion, across body motion   Better with: physical therapy   Treatments tried: physical therapy was improving but now plateau   Associated symptoms: no distal numbness or tingling; denies swelling or warmth    Orthopedic/Surgical history: NO  Social History/Occupation: PhD student at        REVIEW OF SYSTEMS:  Do you have fever, chills, weight loss? No  Do you have any vision problems? No  Do you have any chest pain or edema? No  Do you have any shortness of breath or wheezing?  No  Do you have stomach problems? No  Do you have any numbness or focal weakness? No  Do you have  diabetes? No  Do you have problems with bleeding or clotting? No  Do you have an rashes or other skin lesions? No    OBJECTIVE:  There were no vitals taken for this visit.     EXAM:  Alert, pleasant and conversational    Neck with full AROM, non-tender to midline cervical and upper thoracic spine palpation, negative Spurling's.  Elbow with FROM and non-tender to palpation      right shoulder:   Skin intact. No skin changes, deformity or atrophy    AROM:   Forward Flexion: 180    Abduction: 180    External rotation: ~70    Internal rotation: T7.   symmetric ROM compared to uninjured side  symmetric Scapular motion    Strength testing:   Abduction: 5/5,   External rotation: 5/5   Internal rotation 5/5     Deltoids 5/5, Biceps 5/5, Triceps 5/5,  5/5.    Palpation: negative TTP of the Acromioclavicular joint  negative TTP of Sternoclavicular joint  negative TTP of posterior glenoid  negative TTP of scapular borders  negative TTP of the bicipital tendon.     Special Tests: positive  Logan test  positive  Neer's test.   positive Christian's test   negative Speed's test.  Positive resisted throwers  Positive biceps load    Neurovascularly intact bilateral upper extremities.      RADIOLOGY:    4 view xrays of right shoulder performed and reviewed independently demonstrating no acute fracture or dislocation. No djd. See EMR for formal radiology report.                Again, thank you for allowing me to participate in the care of your patient.      Sincerely,    Meño Leiva MD

## 2023-11-08 ENCOUNTER — LAB (OUTPATIENT)
Dept: LAB | Facility: CLINIC | Age: 33
End: 2023-11-08
Payer: COMMERCIAL

## 2023-11-08 ENCOUNTER — TELEPHONE (OUTPATIENT)
Dept: INTERNAL MEDICINE | Facility: CLINIC | Age: 33
End: 2023-11-08
Payer: COMMERCIAL

## 2023-11-08 DIAGNOSIS — Z31.41 FERTILITY TESTING: ICD-10-CM

## 2023-11-08 DIAGNOSIS — Z31.41 FERTILITY TESTING: Primary | ICD-10-CM

## 2023-11-08 LAB
ABNORMAL SPERM MORPHOLOGY: 95
ABSTINENCE DAYS: 3 DAYS (ref 2–7)
AGGLUTINATION: NO
ANALYSIS TEMP - CENTIGRADE: 18 CENTIGRADE
COLLECTION METHOD: NORMAL
COLLECTION SITE: NORMAL
CONSENT TO RELEASE TO PARTNER: YES
DAL- RECEIVED TIME: NORMAL
HEAD DEFECT: 95 %
IMMOTILE: 17 %
LIQUEFIED: YES
MIDPIECE DEFECT: 32 %
NON-PROGRESSIVE MOTILITY: 7 %
NORMAL SPERM MORPHOLOGY: 5 % NORMAL FORMS
PROGRESSIVE MOTILITY: 76 %
ROUND CELLS: 0.2 MILLION/ML
SPECIMEN PH: 7.2 PH
SPECIMEN VOLUME: 5.1 ML
SPERM CONCENTRATION: 85.5 MILLION/ML
TAIL DEFECT: 4 %
TIME OF ANALYSIS: NORMAL
TOTAL PROGRESSIVE MOTILE NUMBER: 331 MILLION
TOTAL SPERM NUMBER: 436 MILLION
VISCOUS: NO
VITALITY: NORMAL

## 2023-11-08 PROCEDURE — 89322 SEMEN ANAL STRICT CRITERIA: CPT

## 2023-11-08 NOTE — TELEPHONE ENCOUNTER
----- Message from Vinayak Whiting MD sent at 11/8/2023  9:12 AM CST -----  Regarding: FW: ORDERS  Can you do this? I m guessing Leslie knows how   Thx  ----- Message -----  From: Nicole Daniel  Sent: 11/6/2023   8:44 AM CST  To: Vinayak Whiting MD  Subject: ORDERS                                           Good Morning Dr. Whiting  Could you please put in a new order for the semen analysis?  The correct order is [id number], if you have any questions please call us  [phone].    Thank you for allowing us to take care of your patient,    [facility]  -Nicole Daniel MLS(ASCP)

## 2023-11-28 ENCOUNTER — ANCILLARY PROCEDURE (OUTPATIENT)
Dept: MRI IMAGING | Facility: CLINIC | Age: 33
End: 2023-11-28
Attending: FAMILY MEDICINE
Payer: COMMERCIAL

## 2023-11-28 ENCOUNTER — ANCILLARY PROCEDURE (OUTPATIENT)
Dept: INTERVENTIONAL RADIOLOGY/VASCULAR | Facility: CLINIC | Age: 33
End: 2023-11-28
Attending: FAMILY MEDICINE
Payer: COMMERCIAL

## 2023-11-28 DIAGNOSIS — M25.511 RIGHT SHOULDER PAIN, UNSPECIFIED CHRONICITY: ICD-10-CM

## 2023-11-28 PROCEDURE — 77002 NEEDLE LOCALIZATION BY XRAY: CPT | Mod: GC | Performed by: RADIOLOGY

## 2023-11-28 PROCEDURE — 23350 INJECTION FOR SHOULDER X-RAY: CPT | Mod: RT | Performed by: RADIOLOGY

## 2023-11-28 PROCEDURE — A9585 GADOBUTROL INJECTION: HCPCS | Performed by: RADIOLOGY

## 2023-11-28 PROCEDURE — 73222 MRI JOINT UPR EXTREM W/DYE: CPT | Mod: RT | Performed by: RADIOLOGY

## 2023-11-28 RX ORDER — GADOBUTROL 604.72 MG/ML
0.1 INJECTION INTRAVENOUS ONCE
Status: COMPLETED | OUTPATIENT
Start: 2023-11-28 | End: 2023-11-28

## 2023-11-28 RX ORDER — IOPAMIDOL 408 MG/ML
10 INJECTION, SOLUTION INTRATHECAL ONCE
Status: COMPLETED | OUTPATIENT
Start: 2023-11-28 | End: 2023-11-28

## 2023-11-28 RX ORDER — LIDOCAINE HYDROCHLORIDE 10 MG/ML
30 INJECTION, SOLUTION EPIDURAL; INFILTRATION; INTRACAUDAL; PERINEURAL ONCE
Status: COMPLETED | OUTPATIENT
Start: 2023-11-28 | End: 2023-11-28

## 2023-11-28 RX ADMIN — LIDOCAINE HYDROCHLORIDE 10 ML: 10 INJECTION, SOLUTION EPIDURAL; INFILTRATION; INTRACAUDAL; PERINEURAL at 09:49

## 2023-11-28 RX ADMIN — GADOBUTROL 0.1 ML: 604.72 INJECTION INTRAVENOUS at 09:49

## 2023-11-28 RX ADMIN — IOPAMIDOL 7 ML: 408 INJECTION, SOLUTION INTRATHECAL at 09:49

## 2023-12-04 ENCOUNTER — OFFICE VISIT (OUTPATIENT)
Dept: ORTHOPEDICS | Facility: CLINIC | Age: 33
End: 2023-12-04
Payer: COMMERCIAL

## 2023-12-04 DIAGNOSIS — G89.29 CHRONIC RIGHT SHOULDER PAIN: Primary | ICD-10-CM

## 2023-12-04 DIAGNOSIS — M25.511 CHRONIC RIGHT SHOULDER PAIN: Primary | ICD-10-CM

## 2023-12-04 PROCEDURE — 99214 OFFICE O/P EST MOD 30 MIN: CPT | Performed by: FAMILY MEDICINE

## 2023-12-04 NOTE — PROGRESS NOTES
[facility] AND SURGERY CENTER  SPORTS & ORTHOPEDIC CLINIC VISIT     Dec 4, 2023        ASSESSMENT & PLAN    33-year-old with right shoulder pain with playing tennis particularly serving and the finding of posterior labral tear on recent MRI.  It is not clear that this tear is the main cause for his discomfort though he has been somewhat refractory to physical therapy.    Reviewed imaging and assessment with patient in detail  We discussed options for treatment which could include continued physical therapy, activity modifications, consultation with surgeon.  May also considered ultrasound-guided intra-articular injection for diagnostic and therapeutic purposes.  At the current time the patient would like to continue with activity modifications and physical therapy.  May contact us if he would like to try a steroid injection or referral to surgeon.    Meño Leiva MD  [facility] SPORTS MEDICINE CLINIC [place]    -----  No chief complaint on file.      SUBJECTIVE  Edwardo Hope is a/an 33 year old male who is seen for follow up of chronic right shoulder pain.     The patient is seen by themselves.    Date of injury: Spring 2021  Date of Last Visit: 10/26/23   Symptoms: no change  Worsened by: Tennis, above head motion, across body motion  Better with: Physical therapy once every 2 weeks   Treatment to date: physical therapy   Associated symptoms: no distal numbness or tingling; denies swelling or warmth        REVIEW OF SYSTEMS:    See HPI     OBJECTIVE:  There were no vitals taken for this visit.    No significant exam this visit    RADIOLOGY:      MR arthrogram of the right shoulder dated 11/28/2023.  Per radiology report:  Impression:  1. Posterior glenoid labrum intrasubstance tearing.   a. Questionable additional very subtle chondral labral junction tear  at the superior/posterior superior labrum.  2. Delayed onset muscle soreness of the deltoid muscle vs muscle  strain.

## 2023-12-04 NOTE — LETTER
12/4/2023      RE: Edwardo Hope  [address]  [place] [address]     Dear Colleague,    Thank you for referring your patient, Edwardo Hope, to the [facility] SPORTS MEDICINE [facility]. Please see a copy of my visit note below.      [facility] AND SURGERY CENTER  SPORTS & ORTHOPEDIC CLINIC VISIT     Dec 4, 2023        ASSESSMENT & PLAN    33-year-old with right shoulder pain with playing tennis particularly serving and the finding of posterior labral tear on recent MRI.  It is not clear that this tear is the main cause for his discomfort though he has been somewhat refractory to physical therapy.    Reviewed imaging and assessment with patient in detail  We discussed options for treatment which could include continued physical therapy, activity modifications, consultation with surgeon.  May also considered ultrasound-guided intra-articular injection for diagnostic and therapeutic purposes.  At the current time the patient would like to continue with activity modifications and physical therapy.  May contact us if he would like to try a steroid injection or referral to surgeon.    Meño Leiva MD  [facility] SPORTS [facility]    -----  No chief complaint on file.      SUBJECTIVE  Edwardo Hope is a/an 33 year old male who is seen for follow up of chronic right shoulder pain.     The patient is seen by themselves.    Date of injury: Spring 2021  Date of Last Visit: 10/26/23   Symptoms: no change  Worsened by: Tennis, above head motion, across body motion  Better with: Physical therapy once every 2 weeks   Treatment to date: physical therapy   Associated symptoms: no distal numbness or tingling; denies swelling or warmth        REVIEW OF SYSTEMS:  See HPI     OBJECTIVE:  There were no vitals taken for this visit.    No significant exam this visit    RADIOLOGY:      MR arthrogram of the right shoulder dated 11/28/2023.  Per radiology report:  Impression:  1.  Posterior glenoid labrum intrasubstance tearing.   a. Questionable additional very subtle chondral labral junction tear  at the superior/posterior superior labrum.  2. Delayed onset muscle soreness of the deltoid muscle vs muscle  strain.      Again, thank you for allowing me to participate in the care of your patient.      Sincerely,    Meño Leiva MD     Initiate Treatment: - Clobetasol cream bid x 2W for flares Detail Level: Zone Render In Strict Bullet Format?: No

## 2024-09-04 ENCOUNTER — OFFICE VISIT (OUTPATIENT)
Dept: FAMILY MEDICINE | Facility: CLINIC | Age: 34
End: 2024-09-04
Payer: COMMERCIAL

## 2024-09-04 VITALS
HEIGHT: 65 IN | SYSTOLIC BLOOD PRESSURE: 114 MMHG | DIASTOLIC BLOOD PRESSURE: 69 MMHG | OXYGEN SATURATION: 100 % | BODY MASS INDEX: 22.19 KG/M2 | WEIGHT: 133.2 LBS | HEART RATE: 74 BPM

## 2024-09-04 DIAGNOSIS — Z00.00 ROUTINE GENERAL MEDICAL EXAMINATION AT A HEALTH CARE FACILITY: ICD-10-CM

## 2024-09-04 DIAGNOSIS — N50.82 SCROTAL PAIN: ICD-10-CM

## 2024-09-04 DIAGNOSIS — Z00.00 HEALTH CARE MAINTENANCE: Primary | ICD-10-CM

## 2024-09-04 PROCEDURE — 99395 PREV VISIT EST AGE 18-39: CPT | Performed by: FAMILY MEDICINE

## 2024-09-04 NOTE — PATIENT INSTRUCTIONS
Patient Education   Preventive Care Advice   This is general advice given by our system to help you stay healthy. However, your care team may have specific advice just for you. Please talk to your care team about your preventive care needs.  Nutrition  Eat 5 or more servings of fruits and vegetables each day.  Try wheat bread, brown rice and whole grain pasta (instead of white bread, rice, and pasta).  Get enough calcium and vitamin D. Check the label on foods and aim for 100% of the RDA (recommended daily allowance).  Lifestyle  Exercise at least 150 minutes each week  (30 minutes a day, 5 days a week).  Do muscle strengthening activities 2 days a week. These help control your weight and prevent disease.  No smoking.  Wear sunscreen to prevent skin cancer.  Have a dental exam and cleaning every 6 months.  Yearly exams  See your health care team every year to talk about:  Any changes in your health.  Any medicines your care team has prescribed.  Preventive care, family planning, and ways to prevent chronic diseases.  Shots (vaccines)   HPV shots (up to age 26), if you've never had them before.  Hepatitis B shots (up to age 59), if you've never had them before.  COVID-19 shot: Get this shot when it's due.  Flu shot: Get a flu shot every year.  Tetanus shot: Get a tetanus shot every 10 years.  Pneumococcal, hepatitis A, and RSV shots: Ask your care team if you need these based on your risk.  Shingles shot (for age 50 and up)  General health tests  Diabetes screening:  Starting at age 35, Get screened for diabetes at least every 3 years.  If you are younger than age 35, ask your care team if you should be screened for diabetes.  Cholesterol test: At age 39, start having a cholesterol test every 5 years, or more often if advised.  Bone density scan (DEXA): At age 50, ask your care team if you should have this scan for osteoporosis (brittle bones).  Hepatitis C: Get tested at least once in your life.  STIs (sexually  transmitted infections)  Before age 24: Ask your care team if you should be screened for STIs.  After age 24: Get screened for STIs if you're at risk. You are at risk for STIs (including HIV) if:  You are sexually active with more than one person.  You don't use condoms every time.  You or a partner was diagnosed with a sexually transmitted infection.  If you are at risk for HIV, ask about PrEP medicine to prevent HIV.  Get tested for HIV at least once in your life, whether you are at risk for HIV or not.  Cancer screening tests  Cervical cancer screening: If you have a cervix, begin getting regular cervical cancer screening tests starting at age 21.  Breast cancer scan (mammogram): If you've ever had breasts, begin having regular mammograms starting at age 40. This is a scan to check for breast cancer.  Colon cancer screening: It is important to start screening for colon cancer at age 45.  Have a colonoscopy test every 10 years (or more often if you're at risk) Or, ask your provider about stool tests like a FIT test every year or Cologuard test every 3 years.  To learn more about your testing options, visit:   .  For help making a decision, visit:   https://bit.ly/tt03488.  Prostate cancer screening test: If you have a prostate, ask your care team if a prostate cancer screening test (PSA) at age 55 is right for you.  Lung cancer screening: If you are a current or former smoker ages 50 to 80, ask your care team if ongoing lung cancer screenings are right for you.  For informational purposes only. Not to replace the advice of your health care provider. Copyright   2023 [facility]. All rights reserved. Clinically reviewed by the [facility]. [employer] 497031 - REV 01/24.  9 Ways to Cut Back on Drinking  Maybe you've found yourself drinking more alcohol than you'd prefer. If you want to cut back, here are some ideas to try.    Think before you drink.  Do you really want a drink,  "or is it just a habit? If you're used to having a drink at a certain time, try doing something else then.     Look for substitutes.  Find some no-alcohol drinks that you enjoy, like flavored seltzer water, tea with honey, or tonic with a slice of lime. Or try alcohol-free beer or \"virgin\" cocktails (without the alcohol).     Drink more water.  Use water to quench your thirst. Drink a glass of water before you have any alcohol. Have another glass along with every drink or between drinks.     Shrink your drink.  For example, have a bottle of beer instead of a pint. Use a smaller glass for wine. Choose drinks with lower alcohol content (ABV%). Or use less liquor and more mixer in cocktails.     Slow down.  It's easy to drink quickly and without thinking about it. Pay attention, and make each drink last longer.     Do the math.  Total up how much you spend on alcohol each month. How much is that a year? If you cut back, what could you do with the money you save?     Take a break.  Choose a day or two each week when you won't drink at all. Notice how you feel on those days, physically and emotionally. How did you sleep? Do you feel better? Over time, add more break days.     Count calories.  Would you like to lose some weight? For some people that's a good motivator for cutting back. Figure out how many calories are in each drink. How many does that add up to in a day? In a week? In a month?     Practice saying no.  Be ready when someone offers you a drink. Try: \"Thanks, I've had enough.\" Or \"Thanks, but I'm cutting back.\" Or \"No, thanks. I feel better when I drink less.\"   Current as of: November 15, 2023  Content Version: 14.1 2006-2024 [employer].   Care instructions adapted under license by your healthcare professional. If you have questions about a medical condition or this instruction, always ask your healthcare professional. [employer] disclaims any warranty or liability for your use " of this information.  Substance Use Disorder: Care Instructions  Overview     You can improve your life and health by stopping your use of alcohol or drugs. When you don't drink or use drugs, you may feel and sleep better. You may get along better with your family, friends, and coworkers. There are medicines and programs that can help with substance use disorder.  How can you care for yourself at home?  Here are some ways to help you stay sober and prevent relapse.  If you have been given medicine to help keep you sober or reduce your cravings, be sure to take it exactly as prescribed.  Talk to your doctor about programs that can help you stop using drugs or drinking alcohol.  Do not keep alcohol or drugs in your home.  Plan ahead. Think about what you'll say if other people ask you to drink or use drugs. Try not to spend time with people who drink or use drugs.  Use the time and money spent on drinking or drugs to do something that's important to you.  Preventing a relapse  Have a plan to deal with relapse. Learn to recognize changes in your thinking that lead you to drink or use drugs. Get help before you start to drink or use drugs again.  Try to stay away from situations, friends, or places that may lead you to drink or use drugs.  If you feel the need to drink alcohol or use drugs again, seek help right away. Call a trusted friend or family member. Some people get support from organizations such as Narcotics Anonymous or [employer] or from treatment facilities.  If you relapse, get help as soon as you can. Some people make a plan with another person that outlines what they want that person to do for them if they relapse. The plan usually includes how to handle the relapse and who to notify in case of relapse.  Don't give up. Remember that a relapse doesn't mean that you have failed. Use the experience to learn the triggers that lead you to drink or use drugs. Then quit again. Recovery is a lifelong process.  "Many people have several relapses before they are able to quit for good.  Follow-up care is a key part of your treatment and safety. Be sure to make and go to all appointments, and call your doctor if you are having problems. It's also a good idea to know your test results and keep a list of the medicines you take.  When should you call for help?   Call 911  anytime you think you may need emergency care. For example, call if you or someone else:    Has overdosed or has withdrawal signs. Be sure to tell the emergency workers that you are or someone else is using or trying to quit using drugs. Overdose or withdrawal signs may include:  Losing consciousness.  Seizure.  Seeing or hearing things that aren't there (hallucinations).     Is thinking or talking about suicide or harming others.   Where to get help 24 hours a day, 7 days a week   If you or someone you know talks about suicide, self-harm, a mental health crisis, a substance use crisis, or any other kind of emotional distress, get help right away. You can:    Call the Suicide and Crisis Lifeline at 988.     Call 6-114-921-JBNE (1-701.537.9213).     Text HOME to 088208 to access the Crisis Text Line.   Consider saving these numbers in your phone.  Go to [employer] for more information or to chat online.  Call your doctor now or seek immediate medical care if:    You are having withdrawal symptoms. These may include nausea or vomiting, sweating, shakiness, and anxiety.   Watch closely for changes in your health, and be sure to contact your doctor if:    You have a relapse.     You need more help or support to stop.   Where can you learn more?  Go to https://www.health[employer]inal[employer].net/patiented  Enter H573 in the search box to learn more about \"Substance Use Disorder: Care Instructions.\"  Current as of: November 15, 2023               Content Version: 14.0    1584-6706 [employer].   Care instructions adapted under license by your healthcare professional. " If you have questions about a medical condition or this instruction, always ask your healthcare professional. [employer] disclaims any warranty or liability for your use of this information.

## 2024-09-04 NOTE — PROGRESS NOTES
Preventive Care Visit  [facility]  Vinayak Whiting MD, Family Medicine  Sep 4, 2024      Assessment & Plan     Health care maintenance  Due  - Lipid panel reflex to direct LDL Fasting; Future  - Hemoglobin A1c; Future    Scrotal pain  If neg, monitor, call if worse. Unlikely stone vs spine generated, normal exam.   - UA Macroscopic with reflex to Microscopic and Culture - Lab Collect; Future    Routine general medical examination at a health care facility  Done            Counseling  Appropriate preventive services were addressed with this patient via screening, questionnaire, or discussion as appropriate for fall prevention, nutrition, physical activity, Tobacco-use cessation, social engagement, weight loss and cognition.  Checklist reviewing preventive services available has been given to the patient.  Reviewed patient's diet, addressing concerns and/or questions.   The patient reports drinking more than 3 alcoholic drinks per day and/or more than 7 drhnks per week. The patient was counseled and given information about possible harmful effects of excessive alcohol intake.        Return in about 1 year (around 9/4/2025) for Annual Wellness Visit.    John Rodriguez is a 34 year old, presenting for the following:  Physical        9/4/2024     1:17 PM   Additional Questions   Roomed by           HPI  Here in check up  1-off/on, mild, R anterior pelvic discomfort into R scrotum. Remote R inguinal hernia repair. No other MSK/spine or  sx.   2-possible Raynauds, stable, pt knows could refer to Rheum and/or trial Nvasc, he'll consider.   3-both wrists have pain possible sports injury, better w/ PT, might need new PT order he will let us know  4-shoulder pain slowly improving    He will do flu covid shots elsewhere    New baby, number one, coming in a mo, discussed trying to maintain healthy diet exercise then    Past Medical History:   Diagnosis Date    Recurrent L  ankle sprains      Past Surgical History:   Procedure Laterality Date    ARTHROSCOPY KNEE Right 2012    EXCISE MASS BACK Left 5/25/2022    Procedure: EXCISION, MASS, BACK, LEFT;  Surgeon: Thao Dejesus MD;  Location: [facility] OR    HERNIA REPAIR Right 2008    inguinal     Current Outpatient Medications   Medication Sig Dispense Refill    fluocinonide (LIDEX) 0.05 % external solution Apply topically 2 times daily To scalp as needed 60 mL 3    ketoconazole (NIZORAL) 2 % external shampoo MASSAGE INTO WET SCALP, LET SIT 3 TO 5 MINUTES, THEN RINSE. 120 mL 11    fluocinolone acetonide (DERMA SMOOTHE/FS BODY) 0.01 % external oil Apply topically 2 times daily 118.25 mL 3     No current facility-administered medications for this visit.     No Known Allergies  Family History   Problem Relation Age of Onset    Melanoma No family hx of     Skin Cancer No family hx of      Social History     Socioeconomic History    Marital status:      Spouse name: Not on file    Number of children: Not on file    Years of education: Not on file    Highest education level: Not on file   Occupational History    Not on file   Tobacco Use    Smoking status: Never    Smokeless tobacco: Never   Substance and Sexual Activity    Alcohol use: Not on file    Drug use: Not on file    Sexual activity: Not on file   Other Topics Concern    Not on file   Social History Narrative    Not on file     Social Determinants of Health     Financial Resource Strain: Low Risk  (9/1/2024)    Financial Resource Strain     Within the past 12 months, have you or your family members you live with been unable to get utilities (heat, electricity) when it was really needed?: No   Food Insecurity: Low Risk  (9/1/2024)    Food Insecurity     Within the past 12 months, did you worry that your food would run out before you got money to buy more?: No     Within the past 12 months, did the food you bought just not last and you didn t have money to get more?: No    Transportation Needs: Low Risk  (9/1/2024)    Transportation Needs     Within the past 12 months, has lack of transportation kept you from medical appointments, getting your medicines, non-medical meetings or appointments, work, or from getting things that you need?: No   Physical Activity: Sufficiently Active (9/1/2024)    Exercise Vital Sign     Days of Exercise per Week: 5 days     Minutes of Exercise per Session: 90 min   Stress: Stress Concern Present (9/1/2024)    [employer] [place] [employer] - Occupational Stress Questionnaire     Feeling of Stress : To some extent   Social Connections: Unknown (9/1/2024)    Social Connection and Isolation Panel [NHANES]     Frequency of Communication with Friends and Family: Not on file     Frequency of Social Gatherings with Friends and Family: Three times a week     Attends Zoroastrianism Services: Not on file     Active Member of Clubs or Organizations: Not on file     Attends Club or Organization Meetings: Not on file     Marital Status: Not on file   Interpersonal Safety: Low Risk  (10/19/2023)    Interpersonal Safety     Do you feel physically and emotionally safe where you currently live?: Yes     Within the past 12 months, have you been hit, slapped, kicked or otherwise physically hurt by someone?: No     Within the past 12 months, have you been humiliated or emotionally abused in other ways by your partner or ex-partner?: No   Housing Stability: Low Risk  (9/1/2024)    Housing Stability     Do you have housing? : Yes     Are you worried about losing your housing?: No         9/1/2024   General Health   How would you rate your overall physical health? Good   Feel stress (tense, anxious, or unable to sleep) To some extent      (!) STRESS CONCERN      9/1/2024   Nutrition   Three or more servings of calcium each day? Yes   Diet: Regular (no restrictions)   How many servings of fruit and vegetables per day? (!) 2-3   How many sweetened beverages each day? 0-1             9/1/2024   Exercise   Days per week of moderate/strenous exercise 5 days   Average minutes spent exercising at this level 90 min            9/1/2024   Social Factors   Frequency of gathering with friends or relatives Three times a week   Worry food won't last until get money to buy more No   Food not last or not have enough money for food? No   Do you have housing? (Housing is defined as stable permanent housing and does not include staying ouside in a car, in a tent, in an abandoned building, in an overnight shelter, or couch-surfing.) Yes   Are you worried about losing your housing? No   Lack of transportation? No   Unable to get utilities (heat,electricity)? No            9/1/2024   Dental   Dentist two times every year? Yes            9/1/2024   TB Screening   Were you born outside of the [place]? No                9/1/2024   Substance Use   Alcohol more than 3/day or more than 7/wk Yes   How often do you have a drink containing alcohol 4 or more times a week   How many alcohol drinks on typical day 1 or 2   How often do you have 5+ drinks at one occasion Less than monthly   Audit 2/3 Score 1   How often not able to stop drinking once started Never   How often failed to do what normally expected Never   How often needed first drink in am after a heavy drinking session Never   How often feeling of guilt or remorse after drinking Never   How often unable to remember what happened the night before Never   Have you or someone else been injured because of your drinking No   Has anyone been concerned or suggested you cut down on drinking No   TOTAL SCORE - AUDIT 5   Do you use any other substances recreationally? (!) CANNABIS PRODUCTS        Social History     Tobacco Use    Smoking status: Never    Smokeless tobacco: Never           9/1/2024   STI Screening   New sexual partner(s) since last STI/HIV test? No            9/1/2024   Contraception/Family Planning   Questions about contraception or family planning  "No        Reviewed and updated as needed this visit by Provider                         Objective    Exam  /69 (BP Location: Right arm, Patient Position: Sitting, Cuff Size: Adult Regular)   Pulse 74   Ht 1.644 m (5' 4.72\")   Wt 60.4 kg (133 lb 3.2 oz)   SpO2 100%   BMI 22.36 kg/m     Estimated body mass index is 22.36 kg/m  as calculated from the following:    Height as of this encounter: 1.644 m (5' 4.72\").    Weight as of this encounter: 60.4 kg (133 lb 3.2 oz).    Physical Exam  GENERAL: alert and no distress  EYES: Eyes grossly normal to inspection, PERRL and conjunctivae and sclerae normal  HENT: ear canals and TM's normal, nose and mouth without ulcers or lesions  NECK: no adenopathy, no asymmetry, masses, or scars  RESP: lungs clear to auscultation - no rales, rhonchi or wheezes  CV: regular rate and rhythm, normal S1 S2, no S3 or S4, no murmur, click or rub, no peripheral edema  ABDOMEN: soft, nontender, no hepatosplenomegaly, no masses and bowel sounds normal   (male): normal male genitalia without lesions or urethral discharge, no hernia  MS: no gross musculoskeletal defects noted, no edema  SKIN: no suspicious lesions or rashes  NEURO: Normal strength and tone, mentation intact and speech normal  PSYCH: mentation appears normal, affect normal/bright        Signed Electronically by: Vinayak Whiting MD    "

## 2024-09-09 ENCOUNTER — LAB (OUTPATIENT)
Dept: LAB | Facility: CLINIC | Age: 34
End: 2024-09-09
Payer: COMMERCIAL

## 2024-09-09 DIAGNOSIS — Z00.00 HEALTH CARE MAINTENANCE: ICD-10-CM

## 2024-09-09 DIAGNOSIS — N50.82 SCROTAL PAIN: ICD-10-CM

## 2024-09-09 LAB
ALBUMIN UR-MCNC: NEGATIVE MG/DL
APPEARANCE UR: CLEAR
BILIRUB UR QL STRIP: NEGATIVE
CHOLEST SERPL-MCNC: 230 MG/DL
COLOR UR AUTO: NORMAL
FASTING STATUS PATIENT QL REPORTED: YES
GLUCOSE UR STRIP-MCNC: NEGATIVE MG/DL
HBA1C MFR BLD: 5.6 %
HDLC SERPL-MCNC: 55 MG/DL
HGB UR QL STRIP: NEGATIVE
KETONES UR STRIP-MCNC: NEGATIVE MG/DL
LDLC SERPL CALC-MCNC: 164 MG/DL
LEUKOCYTE ESTERASE UR QL STRIP: NEGATIVE
NITRATE UR QL: NEGATIVE
NONHDLC SERPL-MCNC: 175 MG/DL
PH UR STRIP: 7 [PH] (ref 5–7)
SP GR UR STRIP: 1 (ref 1–1.03)
TRIGL SERPL-MCNC: 53 MG/DL
UROBILINOGEN UR STRIP-MCNC: NORMAL MG/DL

## 2024-09-09 PROCEDURE — 83036 HEMOGLOBIN GLYCOSYLATED A1C: CPT | Performed by: FAMILY MEDICINE

## 2024-09-09 PROCEDURE — 36415 COLL VENOUS BLD VENIPUNCTURE: CPT | Performed by: PATHOLOGY

## 2024-09-09 PROCEDURE — 80061 LIPID PANEL: CPT | Performed by: PATHOLOGY

## 2024-09-09 PROCEDURE — 81003 URINALYSIS AUTO W/O SCOPE: CPT | Performed by: PATHOLOGY

## 2024-09-09 PROCEDURE — 99000 SPECIMEN HANDLING OFFICE-LAB: CPT | Performed by: PATHOLOGY

## 2024-10-07 ENCOUNTER — MYC REFILL (OUTPATIENT)
Dept: DERMATOLOGY | Facility: CLINIC | Age: 34
End: 2024-10-07
Payer: COMMERCIAL

## 2024-10-07 DIAGNOSIS — L21.9 SEBORRHEIC DERMATITIS: ICD-10-CM

## 2024-10-09 RX ORDER — KETOCONAZOLE 20 MG/ML
SHAMPOO, SUSPENSION TOPICAL
Qty: 120 ML | Refills: 6 | Status: SHIPPED | OUTPATIENT
Start: 2024-10-09

## 2025-03-12 ENCOUNTER — TELEPHONE (OUTPATIENT)
Dept: FAMILY MEDICINE | Facility: CLINIC | Age: 35
End: 2025-03-12
Payer: COMMERCIAL

## 2025-03-12 NOTE — TELEPHONE ENCOUNTER
Pt declined scheduling on the phone with writer. Pt requested MyC message with scheduling information.     Pt states they will self schedule when it is convenient for them and does not need further phone calls regarding this appointment.

## (undated) DEVICE — DRAPE LAP PEDS DISP 29492

## (undated) DEVICE — ADH SKIN CLOSURE PREMIERPRO EXOFIN MICOR HV 0.5ML 3471

## (undated) DEVICE — SYR 10ML FINGER CONTROL W/O NDL 309695

## (undated) DEVICE — GLOVE PROTEXIS BLUE W/NEU-THERA 7.0  2D73EB70

## (undated) DEVICE — ESU GROUND PAD ADULT W/CORD E7507

## (undated) DEVICE — PACK MINOR CUSTOM ASC

## (undated) DEVICE — LINEN TOWEL PACK X5 5464

## (undated) DEVICE — SU MONOCRYL 4-0 PS-2 18" UND Y496G

## (undated) DEVICE — SU VICRYL 3-0 PS-2 18" UND J497G

## (undated) DEVICE — DRSG PRIMAPORE 03 1/8X6" 66000318

## (undated) DEVICE — LIGHT HANDLE X1 31140133

## (undated) DEVICE — DRSG STERI STRIP 1/2X4" R1547

## (undated) DEVICE — PREP CHLORAPREP 26ML TINTED ORANGE  260815

## (undated) DEVICE — GLOVE PROTEXIS POWDER FREE SMT 6.5  2D72PT65X

## (undated) DEVICE — SOL NACL 0.9% IRRIG 500ML BOTTLE 2F7123

## (undated) RX ORDER — LIDOCAINE HYDROCHLORIDE AND EPINEPHRINE 10; 10 MG/ML; UG/ML
INJECTION, SOLUTION INFILTRATION; PERINEURAL
Status: DISPENSED
Start: 2022-05-25

## (undated) RX ORDER — BUPIVACAINE HYDROCHLORIDE 2.5 MG/ML
INJECTION, SOLUTION INFILTRATION; PERINEURAL
Status: DISPENSED
Start: 2022-05-25

## (undated) RX ORDER — LIDOCAINE HYDROCHLORIDE 10 MG/ML
INJECTION, SOLUTION EPIDURAL; INFILTRATION; INTRACAUDAL; PERINEURAL
Status: DISPENSED
Start: 2023-11-28